# Patient Record
Sex: MALE | Race: BLACK OR AFRICAN AMERICAN | Employment: OTHER | URBAN - METROPOLITAN AREA
[De-identification: names, ages, dates, MRNs, and addresses within clinical notes are randomized per-mention and may not be internally consistent; named-entity substitution may affect disease eponyms.]

---

## 2018-06-14 ENCOUNTER — HOSPITAL ENCOUNTER (OUTPATIENT)
Dept: ULTRASOUND IMAGING | Age: 83
Discharge: HOME | End: 2018-06-14
Attending: INTERNAL MEDICINE
Payer: MEDICARE

## 2018-06-14 DIAGNOSIS — M79.89 PAIN AND SWELLING OF LOWER EXTREMITY, LEFT: ICD-10-CM

## 2018-06-14 DIAGNOSIS — M79.605 PAIN AND SWELLING OF LOWER EXTREMITY, LEFT: ICD-10-CM

## 2018-06-14 PROCEDURE — 93971 EXTREMITY STUDY: CPT

## 2018-07-23 ENCOUNTER — HOME HEALTH ADMISSION (OUTPATIENT)
Dept: HOME HEALTH SERVICES | Facility: HOME HEALTH | Age: 83
End: 2018-07-23

## 2018-07-25 ENCOUNTER — HOME CARE VISIT (OUTPATIENT)
Dept: SCHEDULING | Facility: HOME HEALTH | Age: 83
End: 2018-07-25

## 2020-11-04 PROBLEM — R35.1 NOCTURIA: Status: ACTIVE | Noted: 2020-11-04

## 2020-11-04 PROBLEM — N40.1 BENIGN PROSTATIC HYPERPLASIA WITH INCOMPLETE BLADDER EMPTYING: Status: ACTIVE | Noted: 2020-11-04

## 2020-11-04 PROBLEM — C67.9 BLADDER CANCER (HCC): Status: ACTIVE | Noted: 2020-11-04

## 2020-11-04 PROBLEM — R33.9 INCOMPLETE BLADDER EMPTYING: Status: ACTIVE | Noted: 2020-11-04

## 2020-11-04 PROBLEM — Z12.5 PROSTATE CANCER SCREENING: Status: ACTIVE | Noted: 2020-11-04

## 2020-11-04 PROBLEM — Z12.5 PROSTATE CANCER SCREENING: Status: RESOLVED | Noted: 2020-11-04 | Resolved: 2020-11-04

## 2020-11-04 PROBLEM — R39.14 BENIGN PROSTATIC HYPERPLASIA WITH INCOMPLETE BLADDER EMPTYING: Status: ACTIVE | Noted: 2020-11-04

## 2020-11-04 PROBLEM — I48.91 ATRIAL FIBRILLATION (HCC): Status: ACTIVE | Noted: 2020-11-04

## 2020-11-10 ENCOUNTER — OFFICE VISIT (OUTPATIENT)
Dept: UROLOGY | Age: 85
End: 2020-11-10
Payer: MEDICARE

## 2020-11-10 VITALS — TEMPERATURE: 97.6 F | WEIGHT: 290 LBS | HEIGHT: 75 IN | BODY MASS INDEX: 36.06 KG/M2

## 2020-11-10 DIAGNOSIS — R33.9 INCOMPLETE BLADDER EMPTYING: ICD-10-CM

## 2020-11-10 DIAGNOSIS — C67.9 MALIGNANT NEOPLASM OF URINARY BLADDER, UNSPECIFIED SITE (HCC): ICD-10-CM

## 2020-11-10 DIAGNOSIS — N40.1 BENIGN PROSTATIC HYPERPLASIA WITH INCOMPLETE BLADDER EMPTYING: ICD-10-CM

## 2020-11-10 DIAGNOSIS — R35.1 NOCTURIA: ICD-10-CM

## 2020-11-10 DIAGNOSIS — E66.01 SEVERE OBESITY (HCC): ICD-10-CM

## 2020-11-10 DIAGNOSIS — R39.14 BENIGN PROSTATIC HYPERPLASIA WITH INCOMPLETE BLADDER EMPTYING: ICD-10-CM

## 2020-11-10 DIAGNOSIS — I48.91 ATRIAL FIBRILLATION, UNSPECIFIED TYPE (HCC): ICD-10-CM

## 2020-11-10 LAB
AVG FLOW RATE POC: 5 ML/SECONDS
BILIRUB UR QL STRIP: NEGATIVE
FLOW TIME POC: 41 SECONDS
GLUCOSE UR-MCNC: NEGATIVE MG/DL
KETONES P FAST UR STRIP-MCNC: NEGATIVE MG/DL
MAX FLOW RATE POC: 10 ML/SECONDS
PH UR STRIP: 6 [PH] (ref 4.6–8)
PROT UR QL STRIP: NEGATIVE
PVR POC: 511 CC
SP GR UR STRIP: 1.01 (ref 1–1.03)
TIME TO MAX, POC: 9 SECONDS
UA UROBILINOGEN AMB POC: NORMAL (ref 0.2–1)
URINALYSIS CLARITY POC: CLEAR
URINALYSIS COLOR POC: YELLOW
URINE BLOOD POC: NEGATIVE
URINE LEUKOCYTES POC: NEGATIVE
URINE NITRITES POC: NEGATIVE
VOIDED VOLUME POC: 209 ML
VOIDING TIME POC: 41 SECONDS

## 2020-11-10 PROCEDURE — 51741 ELECTRO-UROFLOWMETRY FIRST: CPT | Performed by: UROLOGY

## 2020-11-10 PROCEDURE — 51798 US URINE CAPACITY MEASURE: CPT | Performed by: UROLOGY

## 2020-11-10 PROCEDURE — 52000 CYSTOURETHROSCOPY: CPT | Performed by: UROLOGY

## 2020-11-10 PROCEDURE — 81003 URINALYSIS AUTO W/O SCOPE: CPT | Performed by: UROLOGY

## 2020-11-10 PROCEDURE — 51725 SIMPLE CYSTOMETROGRAM: CPT | Performed by: UROLOGY

## 2020-11-10 RX ORDER — AMIODARONE HYDROCHLORIDE 200 MG/1
TABLET ORAL
COMMUNITY
Start: 2020-10-11

## 2020-11-10 RX ORDER — BISMUTH SUBSALICYLATE 262 MG
1 TABLET,CHEWABLE ORAL DAILY
COMMUNITY

## 2020-11-10 RX ORDER — SIMVASTATIN 20 MG/1
TABLET, FILM COATED ORAL
COMMUNITY
Start: 2020-08-13

## 2020-11-10 RX ORDER — METOPROLOL TARTRATE 50 MG/1
TABLET ORAL
COMMUNITY
Start: 2020-08-13

## 2020-11-10 RX ORDER — APIXABAN 2.5 MG/1
TABLET, FILM COATED ORAL
COMMUNITY
Start: 2020-11-04

## 2020-11-10 RX ORDER — DIGOXIN 125 MCG
TABLET ORAL
COMMUNITY
Start: 2020-09-19 | End: 2022-08-16

## 2020-11-10 RX ORDER — TORSEMIDE 20 MG/1
TABLET ORAL
COMMUNITY
Start: 2020-11-02 | End: 2022-08-16

## 2020-11-10 RX ORDER — LANOLIN ALCOHOL/MO/W.PET/CERES
CREAM (GRAM) TOPICAL
COMMUNITY

## 2020-11-10 NOTE — ASSESSMENT & PLAN NOTE
He has a known large prostate and incomplete emptying. He was counseled on a obstructing procedure. He is now ready to proceed to a ProLEP. The patient was counseled on the risks, benefits and expected course of surgery. Surgery has risks of bleeding, infection, injury, pain, death or other consequences. Some specific risks of surgery were discussed as well.

## 2020-11-10 NOTE — ASSESSMENT & PLAN NOTE
CMG and uroflow performed today. Not emptying well. He has significant retention of urine >500cc. I recommend a deobstructing procedure.

## 2020-11-10 NOTE — PROGRESS NOTES
HISTORY OF PRESENT ILLNESS  Trinh Gonzalez is a 80 y.o. male. Chief Complaint   Patient presents with    Cystoscopy     He is here today for his annual cystoscopic evaluation. He has a hx of bladder cancer in 2/2011.  5cm tumor resected by Dr. José Treadwell.  Subsequent biopsies without evidence of recurrence, but with chronic cystitis. He has been considering a de-obstructing procedure secondary to his voiding symptoms. He has a history of large residual volumes large residual volumes (449mls on 7/2/19) and slow stream.    Chronic Conditions Addressed Today     1. Benign prostatic hyperplasia with incomplete bladder emptying     Overview      He is not bothered by his symptoms, but does have large residual volumes (449mls on 7/2/19). He is considering a deobstructing procedure due to his incomplete emptying and slow stream. He will monitor and let us know when he is ready to do something, Oz Salcedo MD.           2. Incomplete bladder emptying     Overview      Large residual volumes. 3. Nocturia     Overview      He is up overnight to void. He is on diuretics. 4. Bladder cancer [facility])     Overview      He has a h/o bladder cancer in 2/2011.  5cm tumor resected by Dr. José Treadwell.  Subsequent biopsies without evidence of recurrence, but with chronic cystitis. Annual cystoscopy. 5. Atrial fibrillation ([address]     Overview      On anticoagulation                 Review of Systems   All other systems reviewed and are negative. Past Medical History:   Diagnosis Date    Cancer [facility])     bladder      Past Surgical History:   Procedure Laterality Date    HX UROLOGICAL  07/02/2019    CYSTOSCOPY      Family History   Problem Relation Age of Onset    Cancer Sister         Physical Exam  Constitutional:       General: He is not in acute distress. Appearance: Normal appearance. HENT:      Head: Normocephalic and atraumatic.       Comments: Wearing eye protection, face shield and a mask  Eyes:      Extraocular Movements: Extraocular movements intact. Cardiovascular:      Rate and Rhythm: Normal rate and regular rhythm. Pulmonary:      Effort: Pulmonary effort is normal. No respiratory distress. Breath sounds: Normal breath sounds. No wheezing or rhonchi. Abdominal:      General: There is no distension. Palpations: Abdomen is soft. There is no mass. Tenderness: There is no abdominal tenderness. Hernia: No hernia is present. Genitourinary:     Penis: Normal. No phimosis, hypospadias or tenderness. Scrotum/Testes: Normal.         Right: Mass, tenderness or swelling not present. Left: Mass, tenderness or swelling not present. Epididymis:      Right: Normal. No mass or tenderness. Left: Normal. No mass or tenderness. Musculoskeletal: Normal range of motion. Lymphadenopathy:      Cervical: No cervical adenopathy. Skin:     General: Skin is warm and dry. Neurological:      General: No focal deficit present. Mental Status: He is alert and oriented to person, place, and time. Psychiatric:         Mood and Affect: Mood normal.         Behavior: Behavior normal.       Cystoscopy - office    Patient presented for cystoscopy. He was placed supine on the procedure table and his genitals were prepped and with Betadine. Using 16 Spanish flexible cystoscope cystourethroscopy was performed. The urethra was patent without stricturing. The prostatic urethra had coapting lateral lobes. There was a minimal median lobe. The bladder mucosa was without trabeculation, tumors or concerning erythema. The ureteral orifices effluxing clear urine bilaterally. Summary: Benign bladder with BPH and incomplete emptying. CMG    Initial urge at (cc):  >500   Strong urge at (cc): not strong   Findings: No uninhibited contractions noted.   No urge related incontinence noted    Uroflow/ PVR    Max Flow: 10 ml/sec    Avg flow: 5 ml/sec    Voided Volume:  208ml    Residual Volume:511ml    Shape of the curve: flattened curve                ASSESSMENT and PLAN  Diagnoses and all orders for this visit:    1. Malignant neoplasm of urinary bladder, unspecified site [facility])  Assessment & Plan:  Cystoscopy performed today. No evidence of recurrence      2. Incomplete bladder emptying  Assessment & Plan:  CMG and uroflow performed today. Not emptying well. He has significant retention of urine >500cc. I recommend a deobstructing procedure. 3. Benign prostatic hyperplasia with incomplete bladder emptying  Assessment & Plan:  He has a known large prostate and incomplete emptying. He was counseled on a obstructing procedure. He is now ready to proceed to a ProLEP. The patient was counseled on the risks, benefits and expected course of surgery. Surgery has risks of bleeding, infection, injury, pain, death or other consequences. Some specific risks of surgery were discussed as well. 4. Nocturia  Assessment & Plan:  Large residual urines, poor flow. Has peripheral edema as well      5. Atrial fibrillation, unspecified type ([address]    6. Severe obesity ([address]  Assessment & Plan:  He is encouraged to continue to lose weight.         Other orders  -     AMB POC URINALYSIS DIP STICK AUTO W/O MICRO  -     AMB POC PVR, IVELISSE,POST-VOID RES,US,NON-IMAGING  -     AMB POC UROFLOWMETRY             Adair Vazquez MD

## 2020-12-08 ENCOUNTER — HOSPITAL ENCOUNTER (OUTPATIENT)
Dept: PREADMISSION TESTING | Age: 85
Discharge: HOME | End: 2020-12-08
Payer: MEDICARE

## 2020-12-08 VITALS
SYSTOLIC BLOOD PRESSURE: 115 MMHG | HEART RATE: 60 BPM | HEIGHT: 75 IN | OXYGEN SATURATION: 100 % | WEIGHT: 295 LBS | DIASTOLIC BLOOD PRESSURE: 60 MMHG | TEMPERATURE: 98.9 F | RESPIRATION RATE: 16 BRPM | BODY MASS INDEX: 36.68 KG/M2

## 2020-12-08 LAB
ANION GAP SERPL CALC-SCNC: 3 MMOL/L (ref 5–15)
APPEARANCE UR: ABNORMAL
BACTERIA URNS QL MICRO: NEGATIVE /HPF
BILIRUB UR QL: NEGATIVE
BUN SERPL-MCNC: 39 MG/DL (ref 6–20)
BUN/CREAT SERPL: 15 (ref 12–20)
CA-I BLD-MCNC: 8.5 MG/DL (ref 8.5–10.1)
CHLORIDE SERPL-SCNC: 105 MMOL/L (ref 97–108)
CO2 SERPL-SCNC: 33 MMOL/L (ref 21–32)
COLOR UR: ABNORMAL
CREAT SERPL-MCNC: 2.61 MG/DL (ref 0.7–1.3)
ERYTHROCYTE [DISTWIDTH] IN BLOOD BY AUTOMATED COUNT: 15.1 % (ref 11.5–14.5)
GLUCOSE SERPL-MCNC: 83 MG/DL (ref 65–100)
GLUCOSE UR STRIP.AUTO-MCNC: NEGATIVE MG/DL
HCT VFR BLD AUTO: 35.7 % (ref 36.6–50.3)
HGB BLD-MCNC: 11.2 G/DL (ref 12.1–17)
HGB UR QL STRIP: NEGATIVE
KETONES UR QL STRIP.AUTO: NEGATIVE MG/DL
LEUKOCYTE ESTERASE UR QL STRIP.AUTO: ABNORMAL
MCH RBC QN AUTO: 29.6 PG (ref 26–34)
MCHC RBC AUTO-ENTMCNC: 31.4 G/DL (ref 30–36.5)
MCV RBC AUTO: 94.4 FL (ref 80–99)
MUCOUS THREADS URNS QL MICRO: ABNORMAL /LPF
NITRITE UR QL STRIP.AUTO: POSITIVE
PH UR STRIP: 5 [PH] (ref 5–8)
PLATELET # BLD AUTO: 145 K/UL (ref 150–400)
PMV BLD AUTO: 10 FL (ref 8.9–12.9)
POTASSIUM SERPL-SCNC: 4.2 MMOL/L (ref 3.5–5.1)
PROT UR STRIP-MCNC: NEGATIVE MG/DL
RBC # BLD AUTO: 3.78 M/UL (ref 4.1–5.7)
RBC #/AREA URNS HPF: ABNORMAL /HPF (ref 0–5)
SODIUM SERPL-SCNC: 141 MMOL/L (ref 136–145)
SP GR UR REFRACTOMETRY: 1.01 (ref 1–1.03)
UROBILINOGEN UR QL STRIP.AUTO: 0.1 EU/DL (ref 0.1–1)
WBC # BLD AUTO: 3.3 K/UL (ref 4.1–11.1)
WBC URNS QL MICRO: >100 /HPF (ref 0–4)

## 2020-12-08 PROCEDURE — 81001 URINALYSIS AUTO W/SCOPE: CPT

## 2020-12-08 PROCEDURE — 36415 COLL VENOUS BLD VENIPUNCTURE: CPT

## 2020-12-08 PROCEDURE — 87077 CULTURE AEROBIC IDENTIFY: CPT

## 2020-12-08 PROCEDURE — 87086 URINE CULTURE/COLONY COUNT: CPT

## 2020-12-08 PROCEDURE — 87186 SC STD MICRODIL/AGAR DIL: CPT

## 2020-12-08 PROCEDURE — 85027 COMPLETE CBC AUTOMATED: CPT

## 2020-12-08 PROCEDURE — 80048 BASIC METABOLIC PNL TOTAL CA: CPT

## 2020-12-08 NOTE — PERIOP NOTES
Called Dr. Marivel Zuñiga office, spoke to Scot Estes, re: abnormal lab results, Scot Esets stated would make Dr. Po Clements aware.

## 2020-12-09 DIAGNOSIS — R33.9 INCOMPLETE BLADDER EMPTYING: Primary | ICD-10-CM

## 2020-12-09 RX ORDER — NITROFURANTOIN 25; 75 MG/1; MG/1
100 CAPSULE ORAL 2 TIMES DAILY
Qty: 20 CAP | Refills: 0 | Status: SHIPPED | OUTPATIENT
Start: 2020-12-09 | End: 2020-12-18 | Stop reason: ALTCHOICE

## 2020-12-09 NOTE — PERIOP NOTES
Return call from Kristin Ron, Dr. Jose Alberto Song office, Kristin Ron stated Dr. Jose Alberto Song received message and patient would be contacted by office.

## 2020-12-11 LAB
BACTERIA SPEC CULT: ABNORMAL
COLONY COUNT,CNT: ABNORMAL
SPECIAL REQUESTS,SREQ: ABNORMAL

## 2020-12-13 ENCOUNTER — HOSPITAL ENCOUNTER (OUTPATIENT)
Dept: PREADMISSION TESTING | Age: 85
Discharge: HOME | End: 2020-12-13
Payer: MEDICARE

## 2020-12-13 LAB — SARS-COV-2, COV2: NORMAL

## 2020-12-13 PROCEDURE — 87635 SARS-COV-2 COVID-19 AMP PRB: CPT

## 2020-12-14 LAB — SARS-COV-2, COV2NT: NOT DETECTED

## 2020-12-16 DIAGNOSIS — N30.00 ACUTE CYSTITIS WITHOUT HEMATURIA: Primary | ICD-10-CM

## 2020-12-16 NOTE — ASSESSMENT & PLAN NOTE
On Eliquis. He is s/p LEMP on 12/17/2020. Instructed to hold for 7 days pre-op and 3-7 days post-op until his urine is clear for several days. Key CAD CHF Meds             amiodarone (CORDARONE) 200 mg tablet     metoprolol tartrate (LOPRESSOR) 50 mg tablet TAKE 1 TABLET BY MOUTH TWICE A DAY WITH FOOD    Eliquis 2.5 mg tablet TAKE 1 TABLET BY MOUTH TWICE A DAY    simvastatin (ZOCOR) 20 mg tablet TAKE 1 TABLET BY MOUTH EVERYDAY AT BEDTIME    torsemide (DEMADEX) 20 mg tablet TAKE 1 TABLET BY MOUTH EVERY DAY    digoxin (LANOXIN) 0.125 mg tablet Takes 5x week.  Sunday, Monday, Wednesday, Thursday, Saturday        Lab Results   Component Value Date/Time    Sodium 141 12/08/2020 10:10 AM    Potassium 4.2 12/08/2020 10:10 AM

## 2020-12-16 NOTE — ASSESSMENT & PLAN NOTE
Annual cystoscopy. Due around 11/2021. Key Oncology Meds     Patient is on no Oncologic meds. Key Pain Meds     The patient is on no pain meds.         Lab Results   Component Value Date/Time    WBC 3.3 12/08/2020 10:10 AM    HGB 11.2 12/08/2020 10:10 AM    HCT 35.7 12/08/2020 10:10 AM    PLATELET 171 63/38/3054 10:10 AM    Creatinine 2.61 12/08/2020 10:10 AM    BUN 39 12/08/2020 10:10 AM

## 2020-12-16 NOTE — PROGRESS NOTES
HISTORY OF PRESENT ILLNESS  Rod Guthrie is a 80 y.o. male. Chief Complaint   Patient presents with    Post OP Follow Up    Voiding Trial     He is here today for a voiding trial following LEMP on 12/17/2020. He has no pain. He has some blood in the urine. He was on Eliquis. He was able to void easily after from home. All problems listed below were reviewed, updated, and discussed at this visit. Chronic Conditions Addressed Today     1. Benign prostatic hyperplasia with incomplete bladder emptying     Overview      He is not bothered by his symptoms, but does have large residual volumes (449mls on 7/2/19). He underwent a LEMP on 12/17/2020 due to his incomplete emptying and slow stream.  Follow up in 2-4 weeks to assess voiding patterns. Current Assessment & Plan      He is 1 day status post a laser enucleation of the prostate. His Suarez catheter was removed. He is instructed to hydrate well. He is also instructed to stay off blood thinners until his urine is clear for 2 days. 2. Incomplete bladder emptying     Overview      Large residual volumes. Current Assessment & Plan      He is s/p LEMP on 12/17/2020. Follow up in 2-4 weeks to assess voiding patterns. Relevant Orders     IRRIGATION OF BLADDER (Completed)    3. Nocturia     Overview      He is up overnight to void. He is on diuretics. Current Assessment & Plan      He is s/p LEMP on 12/17/2020. Follow up in 2-4 weeks to assess voiding patterns. On diuretics. 4. Bladder cancer [facility])     Overview      He has a h/o bladder cancer in 2/2011.  5cm tumor resected by Dr. Janes Escobedo.  Subsequent biopsies without evidence of recurrence, but with chronic cystitis. Annual cystoscopy. Due around 11/2021. Current Assessment & Plan      Annual cystoscopy. Due around 11/2021. Key Oncology Meds     Patient is on no Oncologic meds.         Key Pain Meds     The patient is on no pain meds. Lab Results   Component Value Date/Time    WBC 3.3 12/08/2020 10:10 AM    HGB 11.2 12/08/2020 10:10 AM    HCT 35.7 12/08/2020 10:10 AM    PLATELET 989 41/22/0878 10:10 AM    Creatinine 2.61 12/08/2020 10:10 AM    BUN 39 12/08/2020 10:10 AM            5. Atrial fibrillation ([address]     Overview      On anticoagulation          Current Assessment & Plan      On Eliquis. He is s/p LEMP on 12/17/2020. Instructed to hold for 7 days pre-op and 3-7 days post-op until his urine is clear for several days. Key CAD CHF Meds             amiodarone (CORDARONE) 200 mg tablet     metoprolol tartrate (LOPRESSOR) 50 mg tablet TAKE 1 TABLET BY MOUTH TWICE A DAY WITH FOOD    Eliquis 2.5 mg tablet TAKE 1 TABLET BY MOUTH TWICE A DAY    simvastatin (ZOCOR) 20 mg tablet TAKE 1 TABLET BY MOUTH EVERYDAY AT BEDTIME    torsemide (DEMADEX) 20 mg tablet TAKE 1 TABLET BY MOUTH EVERY DAY    digoxin (LANOXIN) 0.125 mg tablet Takes 5x week.  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    Chronic kidney disease     stage 2    Elevated cholesterol     Glaucoma     Hematuria     Hypertension     Kidney disorder     patient states not functioning at 100 percent, hasnt been told CKD      Past Surgical History:   Procedure Laterality Date    HX COLONOSCOPY      HX HEART CATHETERIZATION  2009    HX OTHER SURGICAL      removed mass, thymoma    HX PACEMAKER  2013    HX PARATHYROIDECTOMY  1990    HX UROLOGICAL  07/02/2019    CYSTOSCOPY     IR INSERT TUNL CVC W PORT OVER 5 YEARS      pt states hasnt had chemo in 2 years     Family History   Problem Relation Age of Onset  Cancer Sister     No Known Problems Mother     Stroke Father         Physical Exam                ASSESSMENT and PLAN  Diagnoses and all orders for this visit:    1. Malignant neoplasm of urinary bladder, unspecified site [facility])  Assessment & Plan:  Annual cystoscopy. Due around 11/2021. Key Oncology Meds     Patient is on no Oncologic meds. Key Pain Meds     The patient is on no pain meds. Lab Results   Component Value Date/Time    WBC 3.3 12/08/2020 10:10 AM    HGB 11.2 12/08/2020 10:10 AM    HCT 35.7 12/08/2020 10:10 AM    PLATELET 329 05/34/2991 10:10 AM    Creatinine 2.61 12/08/2020 10:10 AM    BUN 39 12/08/2020 10:10 AM         2. Benign prostatic hyperplasia with incomplete bladder emptying  Assessment & Plan:  He is 1 day status post a laser enucleation of the prostate. His Suarez catheter was removed. He is instructed to hydrate well. He is also instructed to stay off blood thinners until his urine is clear for 2 days. 3. Incomplete bladder emptying  Assessment & Plan:  He is s/p LEMP on 12/17/2020. Follow up in 2-4 weeks to assess voiding patterns. Orders:  -     IRRIGATION OF BLADDER    4. Nocturia  Assessment & Plan:  He is s/p LEMP on 12/17/2020. Follow up in 2-4 weeks to assess voiding patterns. On diuretics. 5. Atrial fibrillation, unspecified type [facility])  Assessment & Plan:  On Eliquis. He is s/p LEMP on 12/17/2020. Instructed to hold for 7 days pre-op and 3-7 days post-op until his urine is clear for several days. Key CAD CHF Meds             amiodarone (CORDARONE) 200 mg tablet     metoprolol tartrate (LOPRESSOR) 50 mg tablet TAKE 1 TABLET BY MOUTH TWICE A DAY WITH FOOD    Eliquis 2.5 mg tablet TAKE 1 TABLET BY MOUTH TWICE A DAY    simvastatin (ZOCOR) 20 mg tablet TAKE 1 TABLET BY MOUTH EVERYDAY AT BEDTIME    torsemide (DEMADEX) 20 mg tablet TAKE 1 TABLET BY MOUTH EVERY DAY    digoxin (LANOXIN) 0.125 mg tablet Takes 5x week.  Sunday, Monday, Wednesday, Thursday, Saturday        Lab Results   Component Value Date/Time    Sodium 141 12/08/2020 10:10 AM    Potassium 4.2 12/08/2020 10:10 AM                  Vicki Fuchs MD

## 2020-12-17 ENCOUNTER — ANESTHESIA (OUTPATIENT)
Dept: SURGERY | Age: 85
End: 2020-12-17
Payer: MEDICARE

## 2020-12-17 ENCOUNTER — ANESTHESIA EVENT (OUTPATIENT)
Dept: SURGERY | Age: 85
End: 2020-12-17
Payer: MEDICARE

## 2020-12-17 ENCOUNTER — HOSPITAL ENCOUNTER (OUTPATIENT)
Age: 85
Setting detail: OUTPATIENT SURGERY
Discharge: HOME | End: 2020-12-17
Attending: UROLOGY | Admitting: UROLOGY
Payer: MEDICARE

## 2020-12-17 VITALS
SYSTOLIC BLOOD PRESSURE: 122 MMHG | TEMPERATURE: 97.2 F | RESPIRATION RATE: 20 BRPM | OXYGEN SATURATION: 99 % | HEART RATE: 60 BPM | DIASTOLIC BLOOD PRESSURE: 69 MMHG

## 2020-12-17 LAB — POTASSIUM SERPL-SCNC: 4.4 MMOL/L (ref 3.5–5.1)

## 2020-12-17 PROCEDURE — 74011000258 HC RX REV CODE- 258: Performed by: NURSE ANESTHETIST, CERTIFIED REGISTERED

## 2020-12-17 PROCEDURE — 76210000006 HC OR PH I REC 0.5 TO 1 HR: Performed by: UROLOGY

## 2020-12-17 PROCEDURE — 74011000250 HC RX REV CODE- 250: Performed by: UROLOGY

## 2020-12-17 PROCEDURE — 77030039343: Performed by: UROLOGY

## 2020-12-17 PROCEDURE — 77030010545: Performed by: UROLOGY

## 2020-12-17 PROCEDURE — 36415 COLL VENOUS BLD VENIPUNCTURE: CPT

## 2020-12-17 PROCEDURE — 88305 TISSUE EXAM BY PATHOLOGIST: CPT

## 2020-12-17 PROCEDURE — 77030013189 HC SLV COMPR SCD HUNT -B: Performed by: UROLOGY

## 2020-12-17 PROCEDURE — 84132 ASSAY OF SERUM POTASSIUM: CPT

## 2020-12-17 PROCEDURE — 2709999900 HC NON-CHARGEABLE SUPPLY: Performed by: UROLOGY

## 2020-12-17 PROCEDURE — 77030007851 HC IRR CYSTO/TUR BSC -B: Performed by: UROLOGY

## 2020-12-17 PROCEDURE — 76060000034 HC ANESTHESIA 1.5 TO 2 HR: Performed by: UROLOGY

## 2020-12-17 PROCEDURE — 74011250636 HC RX REV CODE- 250/636: Performed by: UROLOGY

## 2020-12-17 PROCEDURE — 52649 PROSTATE LASER ENUCLEATION: CPT | Performed by: UROLOGY

## 2020-12-17 PROCEDURE — 76210000026 HC REC RM PH II 1 TO 1.5 HR: Performed by: UROLOGY

## 2020-12-17 PROCEDURE — 77030034696 HC CATH URETH FOL 2W BARD -A: Performed by: UROLOGY

## 2020-12-17 PROCEDURE — 74011250636 HC RX REV CODE- 250/636: Performed by: NURSE ANESTHETIST, CERTIFIED REGISTERED

## 2020-12-17 PROCEDURE — 74011000250 HC RX REV CODE- 250: Performed by: NURSE ANESTHETIST, CERTIFIED REGISTERED

## 2020-12-17 PROCEDURE — C1758 CATHETER, URETERAL: HCPCS | Performed by: UROLOGY

## 2020-12-17 PROCEDURE — 76010000162 HC OR TIME 1.5 TO 2 HR INTENSV-TIER 1: Performed by: UROLOGY

## 2020-12-17 RX ORDER — SODIUM CHLORIDE, SODIUM LACTATE, POTASSIUM CHLORIDE, CALCIUM CHLORIDE 600; 310; 30; 20 MG/100ML; MG/100ML; MG/100ML; MG/100ML
INJECTION, SOLUTION INTRAVENOUS
Status: DISCONTINUED | OUTPATIENT
Start: 2020-12-17 | End: 2020-12-17 | Stop reason: HOSPADM

## 2020-12-17 RX ORDER — METOPROLOL TARTRATE 5 MG/5ML
2.5 INJECTION INTRAVENOUS
Status: DISCONTINUED | OUTPATIENT
Start: 2020-12-17 | End: 2020-12-17 | Stop reason: HOSPADM

## 2020-12-17 RX ORDER — LABETALOL HCL 20 MG/4 ML
10 SYRINGE (ML) INTRAVENOUS
Status: DISCONTINUED | OUTPATIENT
Start: 2020-12-17 | End: 2020-12-17 | Stop reason: HOSPADM

## 2020-12-17 RX ORDER — FENTANYL CITRATE 50 UG/ML
50 INJECTION, SOLUTION INTRAMUSCULAR; INTRAVENOUS
Status: DISCONTINUED | OUTPATIENT
Start: 2020-12-17 | End: 2020-12-17 | Stop reason: HOSPADM

## 2020-12-17 RX ORDER — SODIUM CHLORIDE 9 MG/ML
20 INJECTION, SOLUTION INTRAVENOUS CONTINUOUS
Status: DISCONTINUED | OUTPATIENT
Start: 2020-12-17 | End: 2020-12-17 | Stop reason: HOSPADM

## 2020-12-17 RX ORDER — FENTANYL CITRATE 50 UG/ML
INJECTION, SOLUTION INTRAMUSCULAR; INTRAVENOUS AS NEEDED
Status: DISCONTINUED | OUTPATIENT
Start: 2020-12-17 | End: 2020-12-17 | Stop reason: HOSPADM

## 2020-12-17 RX ORDER — ONDANSETRON 2 MG/ML
4 INJECTION INTRAMUSCULAR; INTRAVENOUS AS NEEDED
Status: DISCONTINUED | OUTPATIENT
Start: 2020-12-17 | End: 2020-12-17 | Stop reason: HOSPADM

## 2020-12-17 RX ORDER — SODIUM CHLORIDE 0.9 % (FLUSH) 0.9 %
5-40 SYRINGE (ML) INJECTION AS NEEDED
Status: DISCONTINUED | OUTPATIENT
Start: 2020-12-17 | End: 2020-12-17 | Stop reason: HOSPADM

## 2020-12-17 RX ORDER — SODIUM CHLORIDE 0.9 % (FLUSH) 0.9 %
5-40 SYRINGE (ML) INJECTION EVERY 8 HOURS
Status: DISCONTINUED | OUTPATIENT
Start: 2020-12-17 | End: 2020-12-17 | Stop reason: HOSPADM

## 2020-12-17 RX ORDER — LIDOCAINE HYDROCHLORIDE 20 MG/ML
INJECTION, SOLUTION EPIDURAL; INFILTRATION; INTRACAUDAL; PERINEURAL AS NEEDED
Status: DISCONTINUED | OUTPATIENT
Start: 2020-12-17 | End: 2020-12-17 | Stop reason: HOSPADM

## 2020-12-17 RX ORDER — HYDROMORPHONE HYDROCHLORIDE 1 MG/ML
0.5 INJECTION, SOLUTION INTRAMUSCULAR; INTRAVENOUS; SUBCUTANEOUS
Status: DISCONTINUED | OUTPATIENT
Start: 2020-12-17 | End: 2020-12-17 | Stop reason: HOSPADM

## 2020-12-17 RX ORDER — SODIUM CHLORIDE 9 MG/ML
1000 INJECTION, SOLUTION INTRAVENOUS CONTINUOUS
Status: DISCONTINUED | OUTPATIENT
Start: 2020-12-17 | End: 2020-12-17 | Stop reason: HOSPADM

## 2020-12-17 RX ORDER — PROPOFOL 10 MG/ML
INJECTION, EMULSION INTRAVENOUS AS NEEDED
Status: DISCONTINUED | OUTPATIENT
Start: 2020-12-17 | End: 2020-12-17 | Stop reason: HOSPADM

## 2020-12-17 RX ADMIN — FENTANYL CITRATE 25 MCG: 50 INJECTION, SOLUTION INTRAMUSCULAR; INTRAVENOUS at 10:38

## 2020-12-17 RX ADMIN — SODIUM CHLORIDE 20 ML/HR: 9 INJECTION, SOLUTION INTRAVENOUS at 08:33

## 2020-12-17 RX ADMIN — FENTANYL CITRATE 50 MCG: 50 INJECTION, SOLUTION INTRAMUSCULAR; INTRAVENOUS at 09:47

## 2020-12-17 RX ADMIN — FENTANYL CITRATE 50 MCG: 50 INJECTION, SOLUTION INTRAMUSCULAR; INTRAVENOUS at 09:27

## 2020-12-17 RX ADMIN — LIDOCAINE HYDROCHLORIDE 100 MG: 20 INJECTION, SOLUTION EPIDURAL; INFILTRATION; INTRACAUDAL; PERINEURAL at 09:36

## 2020-12-17 RX ADMIN — SODIUM CHLORIDE, POTASSIUM CHLORIDE, SODIUM LACTATE AND CALCIUM CHLORIDE: 600; 310; 30; 20 INJECTION, SOLUTION INTRAVENOUS at 09:27

## 2020-12-17 RX ADMIN — PROPOFOL 200 MG: 10 INJECTION, EMULSION INTRAVENOUS at 09:36

## 2020-12-17 RX ADMIN — PHENYLEPHRINE HYDROCHLORIDE 200 MCG: 10 INJECTION INTRAVENOUS at 09:50

## 2020-12-17 RX ADMIN — PHENYLEPHRINE HYDROCHLORIDE 200 MCG: 10 INJECTION INTRAVENOUS at 09:40

## 2020-12-17 RX ADMIN — CEFAZOLIN SODIUM 2 G: 1 INJECTION, POWDER, FOR SOLUTION INTRAMUSCULAR; INTRAVENOUS at 09:46

## 2020-12-17 RX ADMIN — FENTANYL CITRATE 25 MCG: 50 INJECTION, SOLUTION INTRAMUSCULAR; INTRAVENOUS at 10:25

## 2020-12-17 NOTE — ANESTHESIA PREPROCEDURE EVALUATION
Relevant Problems   CARDIOVASCULAR   (+) Atrial fibrillation (HCC)      ENDOCRINE   (+) Severe obesity (HCC)      PERSONAL HX & FAMILY HX OF CANCER   (+) Bladder cancer (HCC)       Anesthetic History   No history of anesthetic complications            Review of Systems / Medical History  Patient summary reviewed, nursing notes reviewed and pertinent labs reviewed    Pulmonary  Within defined limits                 Neuro/Psych   Within defined limits           Cardiovascular    Hypertension        Dysrhythmias : atrial fibrillation  Pacemaker         GI/Hepatic/Renal  Within defined limits              Endo/Other        Obesity, arthritis and cancer     Other Findings   Comments: Bladder CA  Prostate CA  Thymoma           Physical Exam    Airway  Mallampati: II  TM Distance: 4 - 6 cm  Neck ROM: decreased range of motion   Mouth opening: Normal     Cardiovascular    Rhythm: regular  Rate: normal        Comments: Pacemaker Dental         Pulmonary  Breath sounds clear to auscultation               Abdominal  GI exam deferred       Other Findings            Anesthetic Plan    ASA: 4  Anesthesia type: general          Induction: Intravenous  Anesthetic plan and risks discussed with: Patient

## 2020-12-17 NOTE — DISCHARGE INSTRUCTIONS
Patient Education   Patient Education        Learning About Removing a Suarez Catheter at Home  Overview  An indwelling catheter helps drain your bladder. The most common type is a Suarez catheter. The Suarez catheter is a thin tube that goes into your urethra. It's held in your bladder by a small balloon filled with fluid. The tube drains urine from your bladder into a bag or container. You may have had the catheter for a few days, weeks, or months. You can remove the catheter at home when your doctor says it's okay to remove it. Get ready to remove the catheter  How to get ready to remove a Suarez catheter at home   1. Empty the urine bag. You can empty it into the toilet or a container, as you normally do. 2. Wash your hands. You can also wear disposable gloves if you want to. How to position yourself to remove a Suarez catheter   1. Remove the tape or straps that hold the catheter to your body. It's usually attached to your thigh. 2. Clean your genital area. You can use soap, water, and a clean cloth. 3. Lie down on your back. You can lie flat on your back. It may be more comfortable with your knees bent. Or you can be in a standing position to remove the catheter, if it's comfortable. Use an absorbent pad or towel to catch any extra urine that comes out. How to drain a Suarez catheter's balloon   1. Prepare the syringe. You may need to move the plunger up and down a few times to loosen it. Leave it open about .5 mL.  2. Insert the tip of the syringe into the balloon port on your catheter. Make sure that you know which port is the balloon port. It's not the one where the urine usually comes out. 3. Allow the fluid to drain out. It should drain on its own, without you pulling the plunger back more. You may need to move the syringe lower to get the fluid to drain. 4. If the syringe fills, empty it. You can empty it into a sink or toilet.  Reattach the syringe and see if any more fluid drains out. You want to completely empty the balloon. How to pull out a Suarez catheter   1. Gently pull the catheter out of your urethra. Pull it slowly and smoothly. 2. Take controlled breaths. This will help you relax. The catheter should slide out easily. 3. Do not force the catheter out. If it doesn't slide out easily, use the syringe again to try to drain more liquid from the balloon. 4. Clean up. Throw away the catheter, the bag, and the absorbent pad, if you used one. You may also want to clean the area around your genitals again. 5. Wash your hands again. How to care for yourself after you remove a Suarez catheter   1. Hydrate. Drink extra fluids for a little while, unless your doctor tells you not to.  2. Urinate. Urinate as you did before you had the catheter. Your doctor may want to measure the amount of urine to make sure that your bladder is working as it should. 3. Be prepared for some discomfort. It may burn a little bit and you may see a small amount of blood in your urine the first few times you urinate after removal.  4. Try a warm bath. If it's hard for you to urinate, try sitting in a few inches of warm water in the bath (sitz bath). If the urge to urinate comes during the sitz bath, it may be easier for you to urinate while you're still in the bath. When should you call for help? Call your doctor now or seek immediate medical care if:    · The catheter gets stuck or hurts when you remove it.     · The catheter looks like it's broken.     · You have problems urinating after the catheter comes out.     · You can't easily remove the catheter. Watch closely for changes in your health, and be sure to contact your doctor if you have any problems. Where can you learn more? Go to http://www.gray.com/  Enter F255 in the search box to learn more about \"Learning About Removing a Suarez Catheter at Home. \"  Current as of: June 29, 2020               Content Version: 12.6  © 6028-6166 [employer]. Care instructions adapted under license by [employer] (which disclaims liability or warranty for this information). If you have questions about a medical condition or this instruction, always ask your healthcare professional. [address] any warranty or liability for your use of this information. Learning About Anesthesia  What is anesthesia? Anesthesia controls pain. And it keeps all your organs working normally during surgery or another kind of procedure. Anesthesia can relax you. It can also make you sleepy or forgetful. Or it may make you unconscious. It depends on what kind you get. Your anesthesia provider (anesthesiologist or nurse anesthetist) will make sure you are comfortable and safe during the procedure or surgery. There are different types of anesthesia. · Local anesthesia. This type numbs a small part of the body. Doctors use it for simple procedures. ? You get a shot in the area the doctor will work on.  ? You will feel some pressure during the procedure. ? You may stay awake. Or you may get medicine to help you relax or sleep. · Regional anesthesia. This type blocks pain to a larger area of the body. It can also help relieve pain right after surgery. And it may reduce your need for other pain medicine after surgery. There are different types. They include:  ? Peripheral nerve block. This is a shot near a specific nerve or group of nerves. It blocks pain in the part of the body supplied by the nerve. This is often used for procedures on the hands, arms, feet, legs, or face. ? Epidural and spinal anesthesia. This is a shot near the spinal cord and the nerves around it. It blocks pain from an entire area of the body, such as the belly, hips, or legs. · General anesthesia. This type affects the brain and the whole body.  You may get it through a small tube placed in a vein (IV). Or you may breathe it in. You are unconscious and will not feel pain. During the surgery, you will be comfortable. Later, you will not remember much about the surgery. What type will you have? The type of anesthesia you have depends on many things, such as:  · The type of surgery or procedure and the reason you are having it. · Test results, such as blood tests. · How worried you feel about the surgery. · Your health. Your doctor and nurses will ask you about any past surgeries. They will ask about any health problems you may have, such as diabetes, lung or heart disease, or a history of stroke. They will want to know if you take medicine, such as blood thinners. Your doctor may also ask if any family members have had any problems with anesthesia. You will talk with your anesthesia provider about your options. In many cases, you may be able to choose the type of anesthesia you have. What are the risks of anesthesia? Major side effects are not common. But all types of anesthesia have some risk. Your risk depends on your overall health. It also depends on the type of anesthesia you have and how you respond to it. Serious but rare risks include breathing problems, heart attack, stroke, and reaction to the medicine. Some health conditions increase the risk of problems. Your anesthesia provider will find out about any health problems you have that may affect your care. Your anesthesia provider will closely watch your vital signs during anesthesia and surgery. This includes checking your blood pressure and heart rate. This may help you avoid problems from anesthesia. What can you do to prepare? You will get a list of instructions to help you prepare. Your doctor will let you know what to expect when you get to the hospital, during the surgery, and after. You will get instructions about when to stop eating and drinking.   If you take medicine, you will get instructions about what you can and can't take before surgery. You will be asked to sign a consent form that says you understand the risks of anesthesia. Before you do, your anesthesia provider will talk with you about the best type for you and the risks and benefits of that type. Many people are nervous before they have anesthesia and surgery. Ask your doctor about ways to relax before surgery. These may include relaxation exercises or medicine. What can you expect after having anesthesia? Right after the surgery, you will be in the recovery room. Nurses will make sure you are comfortable. As the anesthesia wears off, you may feel some pain and discomfort from your surgery. Tell someone if you have pain. Pain medicine works better if you take it before the pain gets bad. You may feel some of the effects of anesthesia for a while. It takes time for the effects of the medicine to completely wear off. · If you had local or regional anesthesia you may feel numb and have less feeling in part of your body. It may also take a few hours for you to be able to move and control your muscles as usual.  · When you first wake up from general anesthesia, you may be confused. Or it may be hard to think clearly. This is normal.  · Don't do anything for 24 hours that requires attention to detail. This includes going to work, making important decisions, or signing any legal documents. Other common side effects of anesthesia include:  · Nausea and vomiting. This does not usually last long. It can be treated with medicine. · A slight drop in body temperature. You may feel cold and shiver when you first wake up. · A sore throat, if you had general anesthesia. · Muscle aches or weakness. · Feeling tired. For minor surgeries, you may go home the same day. For other surgeries you may stay in the hospital. Your doctor will check on your recovery from the anesthesia. He or she will answer any questions you may have.   Follow-up care is a key part of your treatment and safety. Be sure to make and go to all appointments, and call your doctor if you are having problems. It's also a good idea to know your test results and keep a list of the medicines you take. Where can you learn more? Go to http://www.gray.com/  Enter V817 in the search box to learn more about \"Learning About Anesthesia. \"  Current as of: August 22, 2019               Content Version: 12.6  © 2846-8111 [employer]. Care instructions adapted under license by [employer] (which disclaims liability or warranty for this information). If you have questions about a medical condition or this instruction, always ask your healthcare professional. [address] any warranty or liability for your use of this information. Do not take blood thinners if there is any bleeding/ blood in the urine. If urine is clear for 48 hrs then ok to resume. Routine alanis catheter care instructions should be given by discharge nurse.

## 2020-12-17 NOTE — PROGRESS NOTES
Alanis cath intact with bloody urine draining without any clots noted. DC instructions and alanis cath care instruction reviewed with pt and his wife. Pt escorted out via wheelchair to front entrance for dc home with family.

## 2020-12-17 NOTE — ANESTHESIA POSTPROCEDURE EVALUATION
Procedure(s):  CYSTOURETHROSCOPY WITH LASER ENUCLEATION MORCELLATION OF THE PROSTATE.    general    Anesthesia Post Evaluation      Multimodal analgesia: multimodal analgesia used between 6 hours prior to anesthesia start to PACU discharge  Patient location during evaluation: PACU  Patient participation: complete - patient participated  Level of consciousness: awake  Pain score: 0  Pain management: adequate  Airway patency: patent  Anesthetic complications: no  Cardiovascular status: acceptable  Respiratory status: acceptable  Hydration status: acceptable  Post anesthesia nausea and vomiting:  controlled  Final Post Anesthesia Temperature Assessment:  Normothermia (36.0-37.5 degrees C)      INITIAL Post-op Vital signs:   Vitals Value Taken Time   /66 12/17/20 1131   Temp 36.2 °C (97.2 °F) 12/17/20 1114   Pulse 60 12/17/20 1131   Resp 12 12/17/20 1131   SpO2 99 % 12/17/20 1131

## 2020-12-17 NOTE — OP NOTES
UROLOGY OPERATIVE NOTE    Patient: Britni Dumont MRN: [id number]  SSN: [id number]    YOB: 1935  Age: 80 y.o. Sex: male          Pre-operative Diagnosis: Benign localized hyperplasia of prostate with urinary retention [N40.1]  Incomplete emptying of bladder due to benign prostatic hyperplasia [N40.1, R39.14]  Post-operative Diagnosis: Benign localized hyperplasia of prostate with urinary retention [N40.1]  Incomplete emptying of bladder due to benign prostatic hyperplasia [N40.1, R39.14]  Procedure: Cystourethroscopy  laser enucleation with morcellation of the prostate    Surgeon: Sam Serra MD    Anesthesia:  General  Findings: Bilateral coapting lateral lobes, mild median lobe  Estimated Blood Loss:       scant  Drains: 20F alanis catheter  Specimens: prostate chips  Implants: * No implants in log *  Complications: none           Procedure Details: The patient was seen in the pre-operative area. The risks, benefits, complications, alternative treatment options, and expected outcomes were again discussed with the patient. The possibilities of reaction to medication, pain, infection, bleeding, major cardiovascular event, death, damage to surrounding structures were specifically addressed. Informed consent was then obtained. Upon arrival to the operative suite, the patient, procedure, and side were confirmed via a pre-operative \"time-out\". All were in agreement. The patient was carefully positioned and anesthesia was undertaken. Sterile prep and drape was accomplished. Patient was in the lithotomy position. Using a 21 Qatari cystoscope with 30 and 70 degree lenses complete cystoscopy was performed. The urethra was unremarkable. The bladder mucosa was normal in appearance without tumors, lesions or trabeculation seen. The ureteral orifices were seen on either side. There was a slight median lobe of the prostate and large coapting lateral lobes.     Using a [place] obturator a 32 Monegasque continuous flow resectoscope sheath was introduced. The loop laser scope was introduced and with a end-fire laser fiber and the 1470 Protouch laser the prostate was scored at the 5 o'clock position on the left posterior side from the bladder neck to the verumontanum. Similarly the prostate was scored at the 7 o'clock position. The median lobe was undermined and enucleated to the bladder. The prostate was scored at the 1:00 o'clock position on the left down to near capsular fibers. This was resected down to the level of the verumontanum. Adenomatous tissue was undermined and enucleated into the bladder. Similarly the right lateral lobe was taken down. Anterior prostatic tissue was ablated. Distal lateral nodular tissue was ablated down. The laser scope was removed and a offset cystoscope was introduced. Using dual inflow irrigation, the suction morcellator was introduced. A free-floating adenoma was suctioned morcellated. There appeared to be no residual chips within the bladder. The offset scope was removed and the laser scope was reintroduced. The tissue alongside the verumontanum was ablated. Hemostasis was excellent. The scope was removed and the patient had a good urine stream with Crede maneuver. An 21 Monegasque Suarez catheter was introduced with 10 cc in the balloon. The outflow was trace pink. The patient tolerated the procedure well and was transported in stable condition to recovery. The patient was transported in stable condition to recovery.      Rochelle Cowan MD

## 2020-12-18 ENCOUNTER — OFFICE VISIT (OUTPATIENT)
Dept: UROLOGY | Age: 85
End: 2020-12-18
Payer: MEDICARE

## 2020-12-18 VITALS — BODY MASS INDEX: 35.93 KG/M2 | WEIGHT: 289 LBS | HEIGHT: 75 IN | TEMPERATURE: 97.5 F

## 2020-12-18 DIAGNOSIS — C67.9 MALIGNANT NEOPLASM OF URINARY BLADDER, UNSPECIFIED SITE (HCC): ICD-10-CM

## 2020-12-18 DIAGNOSIS — R39.14 BENIGN PROSTATIC HYPERPLASIA WITH INCOMPLETE BLADDER EMPTYING: ICD-10-CM

## 2020-12-18 DIAGNOSIS — N40.1 BENIGN PROSTATIC HYPERPLASIA WITH INCOMPLETE BLADDER EMPTYING: ICD-10-CM

## 2020-12-18 DIAGNOSIS — R33.9 INCOMPLETE BLADDER EMPTYING: ICD-10-CM

## 2020-12-18 DIAGNOSIS — I48.91 ATRIAL FIBRILLATION, UNSPECIFIED TYPE (HCC): ICD-10-CM

## 2020-12-18 DIAGNOSIS — R35.1 NOCTURIA: ICD-10-CM

## 2020-12-18 PROCEDURE — 99024 POSTOP FOLLOW-UP VISIT: CPT | Performed by: UROLOGY

## 2020-12-18 PROCEDURE — 51700 IRRIGATION OF BLADDER: CPT | Performed by: UROLOGY

## 2020-12-18 NOTE — ASSESSMENT & PLAN NOTE
He is 1 day status post a laser enucleation of the prostate. His Suarez catheter was removed. He is instructed to hydrate well. He is also instructed to stay off blood thinners until his urine is clear for 2 days.

## 2021-12-14 ENCOUNTER — OFFICE VISIT (OUTPATIENT)
Dept: UROLOGY | Age: 86
End: 2021-12-14
Payer: MEDICARE

## 2021-12-14 VITALS
HEART RATE: 66 BPM | WEIGHT: 296 LBS | SYSTOLIC BLOOD PRESSURE: 151 MMHG | HEIGHT: 75 IN | BODY MASS INDEX: 36.8 KG/M2 | DIASTOLIC BLOOD PRESSURE: 67 MMHG | OXYGEN SATURATION: 98 %

## 2021-12-14 DIAGNOSIS — C67.9 MALIGNANT NEOPLASM OF URINARY BLADDER, UNSPECIFIED SITE (HCC): Primary | ICD-10-CM

## 2021-12-14 DIAGNOSIS — R35.1 NOCTURIA: ICD-10-CM

## 2021-12-14 DIAGNOSIS — R39.14 BENIGN PROSTATIC HYPERPLASIA WITH INCOMPLETE BLADDER EMPTYING: ICD-10-CM

## 2021-12-14 DIAGNOSIS — R33.9 INCOMPLETE BLADDER EMPTYING: ICD-10-CM

## 2021-12-14 DIAGNOSIS — N40.1 BENIGN PROSTATIC HYPERPLASIA WITH INCOMPLETE BLADDER EMPTYING: ICD-10-CM

## 2021-12-14 LAB
BILIRUB UR QL STRIP: NEGATIVE
GLUCOSE UR-MCNC: NEGATIVE MG/DL
KETONES P FAST UR STRIP-MCNC: NEGATIVE MG/DL
PH UR STRIP: 6 [PH] (ref 4.6–8)
PROT UR QL STRIP: NEGATIVE
SP GR UR STRIP: 1.01 (ref 1–1.03)
UA UROBILINOGEN AMB POC: NORMAL (ref 0.2–1)
URINALYSIS CLARITY POC: CLEAR
URINALYSIS COLOR POC: YELLOW
URINE BLOOD POC: NEGATIVE
URINE LEUKOCYTES POC: NEGATIVE
URINE NITRITES POC: NEGATIVE

## 2021-12-14 PROCEDURE — G8417 CALC BMI ABV UP PARAM F/U: HCPCS | Performed by: UROLOGY

## 2021-12-14 PROCEDURE — G8536 NO DOC ELDER MAL SCRN: HCPCS | Performed by: UROLOGY

## 2021-12-14 PROCEDURE — 81003 URINALYSIS AUTO W/O SCOPE: CPT | Performed by: UROLOGY

## 2021-12-14 PROCEDURE — G8432 DEP SCR NOT DOC, RNG: HCPCS | Performed by: UROLOGY

## 2021-12-14 PROCEDURE — 99214 OFFICE O/P EST MOD 30 MIN: CPT | Performed by: UROLOGY

## 2021-12-14 PROCEDURE — G8427 DOCREV CUR MEDS BY ELIG CLIN: HCPCS | Performed by: UROLOGY

## 2021-12-14 NOTE — PROGRESS NOTES
HISTORY OF PRESENT ILLNESS  Eimlia Frames is a 80 y.o. male. has a past medical history of Arrhythmia, Arthritis, Cancer ([address] Cancer ([address] Chronic kidney disease, Elevated cholesterol, Glaucoma, Hematuria, Hypertension, and Kidney disorder. has a past surgical history that includes ir insert tunl cvc w port over 5 years; hx parathyroidectomy (1990); hx other surgical; hx heart catheterization (2009); hx colonoscopy; hx urological (07/02/2019); hx urological (12/17/2020); hx prostate surgery (12/17/2020); hx pacemaker (2013); and hx pacemaker. Chief Complaint   Patient presents with    Follow-up    Bladder Cancer    Nocturia    Incomplete Bladder Emptying     HPI  Chronic Conditions Addressed Today     1. Benign prostatic hyperplasia with incomplete bladder emptying     Overview      He is not bothered by his symptoms, but does have large residual volumes (449mls on 7/2/19). He underwent a LEMP on 12/17/2020 due to his incomplete emptying and slow stream.  Follow up in 2-4 weeks to assess voiding patterns. Current Assessment & Plan      He feels he has good flow. 2. Incomplete bladder emptying     Overview      Large residual volumes. He is s/p LEMP on 12/17/2020. Current Assessment & Plan       He is doing well. 3. Nocturia     Overview      He is up overnight to void. He is on diuretics. He is s/p LEMP 12/17/2020.         4. Bladder cancer [facility]) - Primary     Overview      12/16/2020: He has a h/o bladder cancer in 2/2011.  5cm tumor resected by Dr. Omi Alonso.  Subsequent biopsies without evidence of recurrence, but with chronic cystitis. Annual cystoscopy. Due around 11/2021. Current Assessment & Plan      He is due for cystoscopy. We can set him up for that.                  Past Medical History:    PMHx (including negatives):  has a past medical history of Arrhythmia, Arthritis, Cancer ([address] Cancer ([address] Chronic kidney disease, Elevated cholesterol, Glaucoma, Hematuria, Hypertension, and Kidney disorder. PSurgHx:  has a past surgical history that includes ir insert tunl cvc w port over 5 years; hx parathyroidectomy (1990); hx other surgical; hx heart catheterization (2009); hx colonoscopy; hx urological (07/02/2019); hx urological (12/17/2020); hx prostate surgery (12/17/2020); hx pacemaker (2013); and hx pacemaker. PSocHx:  reports that he has quit smoking. He quit after 20.00 years of use. He has never used smokeless tobacco. He reports previous alcohol use. He reports that he does not use drugs. ROS  Physical Exam  Allergies   Allergen Reactions    Codeine Unknown (comments)     hallucination    Doxycycline Unknown (comments)     Blurry vision      Prior to Admission medications    Medication Sig Start Date End Date Taking? Authorizing Provider   amiodarone (CORDARONE) 200 mg tablet  10/11/20  Yes Provider, Historical   metoprolol tartrate (LOPRESSOR) 50 mg tablet TAKE 1 TABLET BY MOUTH TWICE A DAY WITH FOOD 8/13/20  Yes Provider, Historical   Eliquis 2.5 mg tablet TAKE 1 TABLET BY MOUTH TWICE A DAY 11/4/20  Yes Provider, Historical   simvastatin (ZOCOR) 20 mg tablet TAKE 1 TABLET BY MOUTH EVERYDAY AT BEDTIME 8/13/20  Yes Provider, Historical   torsemide (DEMADEX) 20 mg tablet TAKE 1 TABLET BY MOUTH EVERY DAY 11/2/20  Yes Provider, Historical   digoxin (LANOXIN) 0.125 mg tablet Takes 5x week. Sunday, Monday, Wednesday, Thursday, Saturday 9/19/20  Yes Provider, Historical   multivitamin (ONE A DAY) tablet Take 1 Tab by mouth daily. Yes Provider, Historical   ferrous sulfate (Iron) 325 mg (65 mg iron) tablet Take  by mouth Daily (before breakfast). Yes Provider, Historical        ASSESSMENT and PLAN  Diagnoses and all orders for this visit:    1. Malignant neoplasm of urinary bladder, unspecified site [facility])  Assessment & Plan:  He is due for cystoscopy. We can set him up for that.         2. Incomplete bladder emptying  Assessment & Plan:   He is doing well. 3. Nocturia    4. Benign prostatic hyperplasia with incomplete bladder emptying  Assessment & Plan:  He feels he has good flow.              Susie Duran MD

## 2021-12-14 NOTE — LETTER
12/14/2021    Patient: Bernell Councilman   YOB: 1935   Date of Visit: 12/14/2021     Arun Smith MD  [address] [place] [address]  [facility] [address]    Dear Arun Smith MD,      Thank you for referring Mr. Dionne Cano to [place] [address] for evaluation. My notes for this consultation are attached. If you have questions, please do not hesitate to call me. I look forward to following your patient along with you.       Sincerely,    Siva Villatoro MD

## 2021-12-14 NOTE — PROGRESS NOTES
Chief Complaint   Patient presents with    Follow-up    Bladder Cancer    Nocturia    Incomplete Bladder Emptying     1. Have you been to the ER, urgent care clinic since your last visit? Hospitalized since your last visit? Yes Where: [facility]     2. Have you seen or consulted any other health care providers outside of the [address] [place] [address] since your last visit? Include any pap smears or colon screening.  No  Visit Vitals  BP (!) 151/67 (BP 1 Location: Right upper arm, BP Patient Position: Sitting, BP Cuff Size: Adult)   Pulse 66   Ht 6' 3\" (1.905 m)   Wt 296 lb (134.3 kg)   SpO2 98%   BMI 37.00 kg/m²

## 2022-03-18 PROBLEM — N40.1 BENIGN PROSTATIC HYPERPLASIA WITH INCOMPLETE BLADDER EMPTYING: Status: ACTIVE | Noted: 2020-11-04

## 2022-03-18 PROBLEM — R39.14 BENIGN PROSTATIC HYPERPLASIA WITH INCOMPLETE BLADDER EMPTYING: Status: ACTIVE | Noted: 2020-11-04

## 2022-03-18 PROBLEM — C67.9 BLADDER CANCER (HCC): Status: ACTIVE | Noted: 2020-11-04

## 2022-03-19 PROBLEM — I48.91 ATRIAL FIBRILLATION (HCC): Status: ACTIVE | Noted: 2020-11-04

## 2022-03-19 PROBLEM — R35.1 NOCTURIA: Status: ACTIVE | Noted: 2020-11-04

## 2022-03-19 PROBLEM — R33.9 INCOMPLETE BLADDER EMPTYING: Status: ACTIVE | Noted: 2020-11-04

## 2022-03-19 PROBLEM — E66.01 SEVERE OBESITY (HCC): Status: ACTIVE | Noted: 2020-11-10

## 2022-03-22 ENCOUNTER — OFFICE VISIT (OUTPATIENT)
Dept: UROLOGY | Age: 87
End: 2022-03-22
Payer: MEDICARE

## 2022-03-22 ENCOUNTER — TELEPHONE (OUTPATIENT)
Dept: UROLOGY | Age: 87
End: 2022-03-22

## 2022-03-22 VITALS — WEIGHT: 296 LBS | BODY MASS INDEX: 36.8 KG/M2 | HEIGHT: 75 IN

## 2022-03-22 DIAGNOSIS — R35.1 NOCTURIA: ICD-10-CM

## 2022-03-22 DIAGNOSIS — C67.9 MALIGNANT NEOPLASM OF URINARY BLADDER, UNSPECIFIED SITE (HCC): Primary | ICD-10-CM

## 2022-03-22 DIAGNOSIS — N99.116 POSTPROCEDURAL STRICTURE OF OVERLAPPING SITES OF URETHRA IN MALE: ICD-10-CM

## 2022-03-22 DIAGNOSIS — I48.91 ATRIAL FIBRILLATION, UNSPECIFIED TYPE (HCC): ICD-10-CM

## 2022-03-22 DIAGNOSIS — N40.1 BENIGN PROSTATIC HYPERPLASIA WITH INCOMPLETE BLADDER EMPTYING: ICD-10-CM

## 2022-03-22 DIAGNOSIS — R33.9 INCOMPLETE BLADDER EMPTYING: ICD-10-CM

## 2022-03-22 DIAGNOSIS — R39.14 BENIGN PROSTATIC HYPERPLASIA WITH INCOMPLETE BLADDER EMPTYING: ICD-10-CM

## 2022-03-22 LAB
BILIRUB UR QL: NEGATIVE
GLUCOSE UR-MCNC: NEGATIVE MG/DL
KETONES P FAST UR STRIP-MCNC: NEGATIVE MG/DL
PH UR STRIP: 6.5 [PH] (ref 4.6–8)
PROT UR QL STRIP: NEGATIVE
SP GR UR STRIP: 1.02 (ref 1–1.03)
UA UROBILINOGEN AMB POC: NORMAL (ref 0.2–1)
URINALYSIS CLARITY POC: CLEAR
URINALYSIS COLOR POC: YELLOW
URINE BLOOD POC: NEGATIVE
URINE LEUKOCYTES POC: NEGATIVE
URINE NITRITES POC: NEGATIVE

## 2022-03-22 PROCEDURE — 52281 CYSTOSCOPY AND TREATMENT: CPT | Performed by: UROLOGY

## 2022-03-22 PROCEDURE — G8417 CALC BMI ABV UP PARAM F/U: HCPCS | Performed by: UROLOGY

## 2022-03-22 PROCEDURE — G8427 DOCREV CUR MEDS BY ELIG CLIN: HCPCS | Performed by: UROLOGY

## 2022-03-22 PROCEDURE — G8536 NO DOC ELDER MAL SCRN: HCPCS | Performed by: UROLOGY

## 2022-03-22 PROCEDURE — 81003 URINALYSIS AUTO W/O SCOPE: CPT | Performed by: UROLOGY

## 2022-03-22 PROCEDURE — 99214 OFFICE O/P EST MOD 30 MIN: CPT | Performed by: UROLOGY

## 2022-03-22 PROCEDURE — G8510 SCR DEP NEG, NO PLAN REQD: HCPCS | Performed by: UROLOGY

## 2022-03-22 RX ORDER — CEPHALEXIN 500 MG/1
500 CAPSULE ORAL 3 TIMES DAILY
Qty: 14 CAPSULE | Refills: 0 | Status: SHIPPED | OUTPATIENT
Start: 2022-03-22 | End: 2022-03-29 | Stop reason: ALTCHOICE

## 2022-03-22 NOTE — PROGRESS NOTES
Cystoscopy - urethral dilation- Male    Findings:    Initial residual: increased but may be due to irrigation  Anterior urethra: mild pendulous urethral stricturing. Prostate: dense prostatic urethral stricture    Unable to pass scope. [employer] sounds used to dilate from 12F to 20F. Scope then able to be passed. Still glide wire inserted via the scope. Bladder mucosa: No obvious tumors. Trabeculation: 1+  Diverticula: none  Ureteral orifices: not well seen due to hematuria after dilation    Over the wire, an 18F [place] tip alanis was inserted. 10cc in balloon. Leg bag applied. Plan on 1 week with alanis.

## 2022-03-22 NOTE — ASSESSMENT & PLAN NOTE
History of laser enucleation of the prostate 12/17/2020. Belén Eaton He had incomplete emptying at that time. He notes good flow afterwards.

## 2022-03-22 NOTE — ASSESSMENT & PLAN NOTE
History of bladder cancer in 2011. Here for annual cystoscopy today. He was found to have a urethral stricture which was dilated. Reconstruction of bladder revealed no obvious tumors however visualization was limited. Plan on follow-up cystoscopy in 3 months.

## 2022-03-22 NOTE — PROGRESS NOTES
HISTORY OF PRESENT ILLNESS  Dulce Chavez is a 80 y.o. male. has a past medical history of Arrhythmia, Arthritis, Cancer ([facility] [address] Cancer ([facility] [address] Chronic kidney disease, Elevated cholesterol, Glaucoma, Hematuria, Hypertension, and Kidney disorder. has a past surgical history that includes ir insert tunl cvc w port over 5 years; hx parathyroidectomy (1990); hx other surgical; hx heart catheterization (2009); hx colonoscopy; hx prostate surgery (12/17/2020); hx pacemaker (2013); hx pacemaker; hx urological (07/02/2019); hx urological (12/17/2020); and hx urological (03/22/2022). Chief Complaint   Patient presents with    Cystoscopy     urethral dilation- 18fr cath     Bladder Cancer     He denies any back pain. He does not note change in voiding symptoms. He feels like his flow is adequate. No burning irritation today. Chronic Conditions Addressed Today     1. Benign prostatic hyperplasia with incomplete bladder emptying     Overview      He is not bothered by his symptoms, but does have large residual volumes (449mls on 7/2/19). He underwent a LEMP on 12/17/2020 due to his incomplete emptying and slow stream.      12/14/21: good flow after LEMP. Current Assessment & Plan       History of laser enucleation of the prostate 12/17/2020. Debbie Charles He had incomplete emptying at that time. He notes good flow afterwards. 2. Incomplete bladder emptying     Overview      Large residual volumes. He is s/p LEMP on 12/17/2020. Doing well after. Current Assessment & Plan       History of large urinary residuals improved after laser enucleation of the prostate December 2020. 3. Nocturia     Overview      He is up overnight to void. He is on diuretics. He is s/p LEMP 12/17/2020. Current Assessment & Plan      Nocturia. He does use diuretics         4.  Bladder cancer [facility]) - Primary     Overview      12/16/2020: He has a h/o bladder cancer in 2/2011.  5cm tumor resected by Dr. Vale Merlin.  Subsequent biopsies without evidence of recurrence, but with chronic cystitis. Annual cystoscopy. Current Assessment & Plan      History of bladder cancer in 2011. Here for annual cystoscopy today. He was found to have a urethral stricture which was dilated. Reconstruction of bladder revealed no obvious tumors however visualization was limited. Plan on follow-up cystoscopy in 3 months. Relevant Orders     AMB POC URINALYSIS DIP STICK AUTO W/O MICRO    5. Atrial fibrillation (HCC)     Overview      On anticoagulation; Eliquis         6. 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 I was unable to pass the scope. Urethra was dilated and a [place] tip Suarez catheter was inserted over the wire. I will prescribe 1 week of Keflex 500 twice daily and have him follow-up in 1 week for catheter removal.          Relevant Orders     AMB POC URINALYSIS DIP STICK AUTO W/O MICRO          Past Medical History:    PMHx (including negatives):  has a past medical history of Arrhythmia, Arthritis, Cancer ([address] Cancer ([address] Chronic kidney disease, Elevated cholesterol, Glaucoma, Hematuria, Hypertension, and Kidney disorder. PSurgHx:  has a past surgical history that includes ir insert tunl cvc w port over 5 years; hx parathyroidectomy (1990); hx other surgical; hx heart catheterization (2009); hx colonoscopy; hx prostate surgery (12/17/2020); hx pacemaker (2013); hx pacemaker; hx urological (07/02/2019); hx urological (12/17/2020); and hx urological (03/22/2022). PSocHx:  reports that he has quit smoking. He quit after 20.00 years of use. He has never used smokeless tobacco. He reports previous alcohol use. He reports that he does not use drugs.      ROS  Physical Exam  Allergies   Allergen Reactions    Codeine Unknown (comments) hallucination    Doxycycline Unknown (comments)     Blurry vision      Prior to Admission medications    Medication Sig Start Date End Date Taking? Authorizing Provider   cephALEXin (KEFLEX) 500 mg capsule Take 1 Capsule by mouth three (3) times daily. 3/22/22  Yes Jag Prasad MD   amiodarone (CORDARONE) 200 mg tablet  10/11/20  Yes Provider, Historical   metoprolol tartrate (LOPRESSOR) 50 mg tablet TAKE 1 TABLET BY MOUTH TWICE A DAY WITH FOOD 8/13/20  Yes Provider, Historical   Eliquis 2.5 mg tablet TAKE 1 TABLET BY MOUTH TWICE A DAY 11/4/20  Yes Provider, Historical   simvastatin (ZOCOR) 20 mg tablet TAKE 1 TABLET BY MOUTH EVERYDAY AT BEDTIME 8/13/20  Yes Provider, Historical   torsemide (DEMADEX) 20 mg tablet TAKE 1 TABLET BY MOUTH EVERY DAY 11/2/20  Yes Provider, Historical   digoxin (LANOXIN) 0.125 mg tablet Takes 5x week. Sunday, Monday, Wednesday, Thursday, Saturday 9/19/20  Yes Provider, Historical   multivitamin (ONE A DAY) tablet Take 1 Tab by mouth daily. Yes Provider, Historical   ferrous sulfate (Iron) 325 mg (65 mg iron) tablet Take  by mouth Daily (before breakfast). Yes Provider, Historical        ASSESSMENT and PLAN  Diagnoses and all orders for this visit:    1. Malignant neoplasm of urinary bladder, unspecified site [facility])  Assessment & Plan:  History of bladder cancer in 2011. Here for annual cystoscopy today. He was found to have a urethral stricture which was dilated. Reconstruction of bladder revealed no obvious tumors however visualization was limited. Plan on follow-up cystoscopy in 3 months. Orders:  -     AMB POC URINALYSIS DIP STICK AUTO W/O MICRO    2. Atrial fibrillation, unspecified type ([address]    3. Benign prostatic hyperplasia with incomplete bladder emptying  Assessment & Plan:   History of laser enucleation of the prostate 12/17/2020. Eri Mcdermott He had incomplete emptying at that time. He notes good flow afterwards.       4. Incomplete bladder emptying  Assessment & Plan: History of large urinary residuals improved after laser enucleation of the prostate December 2020.      5. Nocturia  Assessment & Plan:  Nocturia. He does use diuretics      6. 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 I was unable to pass the scope. Urethra was dilated and a [place] tip Suarez catheter was inserted over the wire. I will prescribe 1 week of Keflex 500 twice daily and have him follow-up in 1 week for catheter removal.    Orders:  -     AMB POC URINALYSIS DIP STICK AUTO W/O MICRO    Other orders  -     cephALEXin (KEFLEX) 500 mg capsule; Take 1 Capsule by mouth three (3) times daily.          Lisa Forman MD

## 2022-03-22 NOTE — LETTER
Name: Melisa Tillman      1935   MRN:  [id number]           PROCEDURAL INFORMED CONSENT FOR OPERATION / PROCEDURE    1. I (we),Merlin Kraft  authorize Mesfin Bradshaw MD     and/or such assistants as may be selected by him/her, to perform the following operation/procedures    CYSTOSCOPY URETHRAL DILATION    Note: If unable to obtain consent prior to an emergent procedure, document the emergent reason in the medical record. This procedure has been explained to my (our) satisfaction and included in the explanation was:   A) the intended benefit, nature, and extent of the procedure to be performed;  B) the significant risks involved and the probability of success;  C) alternative procedures and methods of treatment;  D) the dangers and probable consequences of such alternatives (including no procedure or treatment); E) the expected consequences of the procedure on my future health;  F) whether other qualified individuals would be performing important surgical tasks and / or whether  would be present to advise or support the procedure. I (we) understand that there are other risks of infection and other serious complications in the pre-operative/procedural and postoperative/procedural stages of my (our) care. I (we) have asked all of the questions which I (we) thought were important in deciding whether or not to undergo treatment or diagnosis. These questions have been answered to my (our) satisfaction. I (we) understand that no assurance can be given that the procedure will be a success, and no guarantee or warranty of success has been given to me (us). 2. It has been explained to me (us) that during the course of the operation/procedure, unforeseen conditions may be revealed that necessitate extension of the original procedure(s) or different procedure(s) than those set forth in Paragraph 1.  I (we) authorize and request that the above-named physician, his/her assistants or his/her designees, perform procedures as necessary and desirable if deemed to be in my (our) best interest.    3. I acknowledge that other health care personnel may be observing this procedure for the purpose of medical education or other specified purposes as may be necessary as requested and/or approved by my (our) physician. 4. I (we) consent to the disposal by the hospital Pathologist of the removed tissue, parts or organs in accordance with hospital policy. 5. I do_____ do not______ consent to the use of a local infiltration pain blocking agent that will be used by my provider/surgical provider to help alleviate pain during my procedure. 6. I do_____ do not_____ consent to an emergent blood transfusion in the case of a life-threatening situation that requires blood components to be administered. This consent is valid for 24 hours from the beginning of the procedure. 7. This patient does _____ or does not ______currently have a DNR status/order. If DNR order is in place, obtain Addendum to the Surgical Consent for ALL Patients with a DNR Order to address diana-operative status for limited intervention or DNR suspension. 8. I have read and fully understand the above Consent for Operation/Procedure and that all blanks were completed before I signed the consent.       Signature of Patient or legal representative ______________________________             Printed Name / Relationship                          _______________________________     Date / Time   ________________________ []AM []PM         Witness to Signature ________________________     Printed Name ______________________      Date / Time  _______________________  []AM []PM     (If patient is unable to sign or is a minor, complete the following)           Patient is a minor, ____years of age, or unable to sign because: ___________________             Theodoro Milder If a phone consent is obtained, consent will be documented by using two health care professionals, each affirming that the consenting party   has no questions and gives consent for the procedure discussed with the physician/provider. 2nd witness to phone consent ________________________    Printed name _________________________    Date / Time  _______________________  []AM []PM         Informed consent:  I have provided the explanation described above in section 1 to the patient and/or legal representative. I have provided the patient and/or legal representative with an opportunity to ask any questions about the proposed operation/procedure.       Provider / Ivy Carrion ______________________            Printed Name  __________________________           Date / Time  _______________________  []AM []PM    This Provider/Proceduralist performing the surgery is ONLY for [employer]fice  based Procedures in [place]

## 2022-03-22 NOTE — ASSESSMENT & PLAN NOTE
On cystoscopy today he was found of a dense stricture in the prostatic urethra. I was unable to pass the scope. Urethra was dilated and a [place] tip Suarez catheter was inserted over the wire.   I will prescribe 1 week of Keflex 500 twice daily and have him follow-up in 1 week for catheter removal.    Hold Eliquis until hematuria resolves

## 2022-03-22 NOTE — LETTER
3/22/2022    Patient: Olivia Wright   YOB: 1935   Date of Visit: 3/22/2022     Laurie Becker MD  [address]  [address] [address]  [facility] [address]    Dear Laurie Becker MD,      Thank you for referring Mr. Melissa Cr to [place] [address] for evaluation. My notes for this consultation are attached. If you have questions, please do not hesitate to call me. I look forward to following your patient along with you.       Sincerely,    Tate Villafana MD

## 2022-03-24 PROBLEM — N99.116 POSTPROCEDURAL STRICTURE OF OVERLAPPING SITES OF URETHRA IN MALE: Status: ACTIVE | Noted: 2022-03-22

## 2022-03-28 PROBLEM — N35.919 STRICTURE OF MALE URETHRA: Status: ACTIVE | Noted: 2022-03-28

## 2022-03-28 PROBLEM — N99.116 POSTPROCEDURAL STRICTURE OF OVERLAPPING SITES OF URETHRA IN MALE: Status: RESOLVED | Noted: 2022-03-22 | Resolved: 2022-03-28

## 2022-03-28 NOTE — PROGRESS NOTES
HISTORY OF PRESENT ILLNESS  Alber Hutton is a 80 y.o. male. Chief Complaint   Patient presents with    New Patient    Voiding Trial     cath removal        The patient is here today for a voiding trial following urethral dilation in clinic on 3/22/22. He has hx of large residuals prior to LEMP in 2020. On routine cystoscopy Dr. Binnie Cranker was unable to pass the scope. Eri Chance sounds were used to dilate from 12F to 20F. Scope was then passed. 18F alanis placed for 1 week. Gross hematuria afterward. No voiding trial; Alanis removed secondary to lack of supplies. No continued hematuria per patient. He was provided information on what to do and how to seek care if he is unable to void after leaving the office. He was encouraged to hydrate well. All of his questions were answered. Chronic Conditions Addressed Today     1. Incomplete bladder emptying     Overview      Large residual volumes. He is s/p LEMP on 12/17/2020. Doing well after. 3/22/22: large residual, unable to pass scope. Eri Chance sounds used to dilate from 12F to 20F. Scope then able to be passed. Still glide wire inserted via the scope. 18F alanis placed for 1 week. Current Assessment & Plan      He is s/p dilation of of the urethra in clinic. Alanis removed today. He is provided information on what to do if he is unable to void. He is provided information and catheters for self-catheterization if he is unable to void. We will follow up in 8 weeks to reassess voiding and emptying. Sooner if necessary. Prophylactic abx x 5 days. 2. Stricture of male urethra - Primary     Overview      3/22/22: large residual, unable to pass scope. Eri Chance sounds used to dilate from 12F to 20F. Scope then able to be passed. Still glide wire inserted via the scope. Dense; prostatic urethra. 18F alanis placed for 1 week.           Current Assessment & Plan      He is s/p dilation of of the urethra in clinic. Suarez removed today. He is provided information on what to do if he is unable to void. He is provided information and catheters for self-catheterization if he is unable to void. We will follow up in 8 weeks to reassess voiding and emptying. Sooner if necessary. Prophylactic abx x 5 days. Review of Systems   Constitutional: Negative for chills and fever. Gastrointestinal: Negative for nausea and vomiting. Patient denies the symptoms of COVID-19 per routine screening guidelines. ASSESSMENT and PLAN  Diagnoses and all orders for this visit:    1. Stricture of male urethra, unspecified stricture type  Assessment & Plan:  He is s/p dilation of of the urethra in clinic. Suarez removed today. He is provided information on what to do if he is unable to void. He is provided information and catheters for self-catheterization if he is unable to void. We will follow up in 8 weeks to reassess voiding and emptying. Sooner if necessary. Prophylactic abx x 5 days. 2. Incomplete bladder emptying  Assessment & Plan:  He is s/p dilation of of the urethra in clinic. Suarez removed today. He is provided information on what to do if he is unable to void. He is provided information and catheters for self-catheterization if he is unable to void. We will follow up in 8 weeks to reassess voiding and emptying. Sooner if necessary. Prophylactic abx x 5 days. Other orders  -     cephALEXin (KEFLEX) 500 mg capsule; Take 1 Capsule by mouth three (3) times daily for 5 days. Follow-up and Dispositions    · Return in about 8 weeks (around 5/24/2022) for with Dr. Amaury Doyle; reassess voiding; please print AVS.       Patient given written information on how to perform self-catheterization. He is not overly willing to perform this. He is also provided information via AVS and watched the instructional video in clinic. He had the opportunity to ask questions.      He will return in 3 months. We discussed an 8 week follow up to reassess, but patient had some difficulty with travel and prefers to come at the 3 month previously scheduled lexus. He will call us with problems.   Danica Khan NP

## 2022-03-29 ENCOUNTER — OFFICE VISIT (OUTPATIENT)
Dept: UROLOGY | Age: 87
End: 2022-03-29
Payer: MEDICARE

## 2022-03-29 VITALS
RESPIRATION RATE: 16 BRPM | DIASTOLIC BLOOD PRESSURE: 51 MMHG | TEMPERATURE: 97.3 F | OXYGEN SATURATION: 95 % | SYSTOLIC BLOOD PRESSURE: 112 MMHG | HEART RATE: 65 BPM

## 2022-03-29 DIAGNOSIS — R33.9 INCOMPLETE BLADDER EMPTYING: ICD-10-CM

## 2022-03-29 DIAGNOSIS — N35.919 STRICTURE OF MALE URETHRA, UNSPECIFIED STRICTURE TYPE: Primary | ICD-10-CM

## 2022-03-29 PROCEDURE — G8432 DEP SCR NOT DOC, RNG: HCPCS | Performed by: NURSE PRACTITIONER

## 2022-03-29 PROCEDURE — 99212 OFFICE O/P EST SF 10 MIN: CPT | Performed by: NURSE PRACTITIONER

## 2022-03-29 PROCEDURE — G8427 DOCREV CUR MEDS BY ELIG CLIN: HCPCS | Performed by: NURSE PRACTITIONER

## 2022-03-29 PROCEDURE — G8417 CALC BMI ABV UP PARAM F/U: HCPCS | Performed by: NURSE PRACTITIONER

## 2022-03-29 PROCEDURE — G8536 NO DOC ELDER MAL SCRN: HCPCS | Performed by: NURSE PRACTITIONER

## 2022-03-29 RX ORDER — TIZANIDINE 4 MG/1
TABLET ORAL
COMMUNITY
Start: 2022-03-17 | End: 2022-08-26

## 2022-03-29 RX ORDER — CEPHALEXIN 500 MG/1
500 CAPSULE ORAL 3 TIMES DAILY
Qty: 9 CAPSULE | Refills: 0 | Status: SHIPPED | OUTPATIENT
Start: 2022-03-29 | End: 2022-03-29

## 2022-03-29 RX ORDER — CEPHALEXIN 500 MG/1
500 CAPSULE ORAL 3 TIMES DAILY
Qty: 15 CAPSULE | Refills: 0 | Status: SHIPPED | OUTPATIENT
Start: 2022-03-29 | End: 2022-04-03

## 2022-03-29 NOTE — ASSESSMENT & PLAN NOTE
He is s/p dilation of of the urethra in clinic. Suarez removed today. He is provided information on what to do if he is unable to void. He is provided information and catheters for self-catheterization if he is unable to void. We will follow up in 8 weeks to reassess voiding and emptying. Sooner if necessary. Prophylactic abx x 5 days.

## 2022-03-29 NOTE — PATIENT INSTRUCTIONS
FOLLOW UP REMINDER FROM YOUR SPECIALTY PROVIDER:     Your healthcare provider today may have received a reminder for a \"due or due soon\" health maintenance task. You should contact your primary care provider for follow-up on this health maintenance or other necessary and/or routine health screening. Routine health screenings such as labs, mammogram, or colonoscopy are important to your overall health. Your primary care provider can help to ensure that you are meeting all of your healthcare goals. WHAT TO DO IF YOU CAN'T URINATE AFTER YOUR VOIDING TRIAL. · Ensure you are drinking enough water; avoid caffeine and sugary beverages. · Give yourself 8 hours after leaving the office. If you are well-hydrated and you haven't urinated, please go to the Emergency Room or Urgent Care center and let them know you can't urinate. · If you have to have a catheter put back in, call the office and notify the nurse or Nurse Practitioner. You will have to leave that in place for 5-7 days and we will see you back in the office after that. How to Use a Straight Catheter for Self-Catheterization (Male): Care Instructions    Overview  Self-catheterization is a way to completely empty your bladder when you need to. You put a thin tube called a catheter into your bladder. This lets the urine flow out. You may use a catheter if you have nerve damage, a problem with your urinary tract, or diseases that weaken your muscles. Emptying your bladder regularly can prevent urine leaks during the day. It can also prevent kidney damage from blocked urine and infections. An intermittent, or straight, urinary catheter is taken out right after it is used. Straight catheters come in different lengths and types. They are used one time only. Disposable catheters can be thrown away after each use. You can empty your bladder every 4 to 6 hours, or as your doctor recommends.  It takes practice to learn how to place the catheter. It may be uncomfortable at first, but it should not cause pain. If your doctor asks you to measure your urine, you can catch it in a container that your doctor gives you. Note the amount of urine, and the date and time. It's very important to stay clean when you use the catheter. This helps prevent infection. Keep your hands, the catheter, and the area around your urethra clean. (When you urinate, the urethra carries urine from the bladder to the outside of the body through the penis.)    Follow-up care is a key part of your treatment and safety. Be sure to make and go to all appointments, and call your doctor if you are having problems. It's also a good idea to know your test results and keep a list of the medicines you take. How can you care for yourself at home? How do you safely use a straight catheter (male)? 1. When you need to empty your bladder, try to urinate first, if you can, before you use the catheter. 2. Gather the supplies you need to insert the catheter. You will need:  3. You may want to use a clean washcloth or towel, and a bag or plastic tub to hold the supplies. 4. Wash and dry your hands. · The catheter. · A container to hold the urine. (If you empty the urine right into the toilet, you won't need the container.)  · Lubricating jelly, such as K-Y Jelly, that dissolves in water. Don't use a petroleum jelly such as Vaseline. Clean the area around the urethra   1. Place the urine container between your legs (if you are using one). 2. Clean the end of your penis well with soap and water. (If you are not circumcised, clean under the foreskin too.)  3. Spread the lubricating jelly on the tip of the catheter. Put the other end of the catheter over the toilet bowl or in the container to catch the urine. Insert the catheter   1. Gently insert the catheter into the urethra opening on the penis. Move the catheter in until urine begins to flow out.  Then insert it about 1 inch more. 2. Let the urine drain into the container or the toilet. Drain bladder and remove the catheter   1. Remove the catheter slowly. If you are using a disposable catheter, throw it away. If not, wash the catheter with warm, soapy water. Dry it and put it into a clean container. 2. Wash and dry your hands. When should you call for help? Call your doctor now or seek immediate medical care if:    · You have symptoms of a urinary tract infection. These may include:  ? Pain or burning when you urinate. ? A frequent need to urinate without being able to pass much urine. ? Pain in the flank, which is just below the rib cage and above the waist on either side of the back. ? Blood or pus in your urine. ? A fever. · Your urine smells bad. · You can't pass any urine. Watch closely for changes in your health, and be sure to contact your doctor if you have any problems.

## 2022-03-29 NOTE — PROGRESS NOTES
Chief Complaint   Patient presents with    Follow-up    Urethral Stricture    Incomplete Bladder Emptying     Cath removal       PHQ-9 score is    Negative    Vitals:    03/29/22 1122   BP: (!) 112/51   Pulse: 65   Resp: 16   Temp: 97.3 °F (36.3 °C)   TempSrc: Temporal   SpO2: 95%   PainSc:   0 - No pain      1. \"Have you been to the ER, urgent care clinic since your last visit? Hospitalized since your last visit? \" No    2. \"Have you seen or consulted any other health care providers outside of the [address] [place] [address] since your last visit? \" No     3. For patients aged 39-70: Has the patient had a colonoscopy / FIT/ Cologuard? Yes - no Care Gap present      If the patient is female:    4. For patients aged 41-77: Has the patient had a mammogram within the past 2 years? NA - based on age or sex      11. For patients aged 21-65: Has the patient had a pap smear?  NA - based on age or sex

## 2022-08-11 ENCOUNTER — TRANSCRIBE ORDER (OUTPATIENT)
Dept: SCHEDULING | Age: 87
End: 2022-08-11

## 2022-08-11 DIAGNOSIS — R09.89 DIMINISHED PULSE: Primary | ICD-10-CM

## 2022-08-15 ENCOUNTER — HOSPITAL ENCOUNTER (OUTPATIENT)
Dept: INFUSION THERAPY | Age: 87
Discharge: HOME | End: 2022-08-15
Payer: MEDICARE

## 2022-08-15 VITALS
TEMPERATURE: 97.8 F | SYSTOLIC BLOOD PRESSURE: 110 MMHG | RESPIRATION RATE: 18 BRPM | HEART RATE: 68 BPM | DIASTOLIC BLOOD PRESSURE: 66 MMHG

## 2022-08-15 LAB — HISTORY CHECKED?,CKHIST: NORMAL

## 2022-08-15 PROCEDURE — 86900 BLOOD TYPING SEROLOGIC ABO: CPT

## 2022-08-15 PROCEDURE — 36415 COLL VENOUS BLD VENIPUNCTURE: CPT

## 2022-08-15 PROCEDURE — 86923 COMPATIBILITY TEST ELECTRIC: CPT

## 2022-08-15 RX ORDER — DIPHENHYDRAMINE HCL 25 MG
25 CAPSULE ORAL ONCE
Status: COMPLETED | OUTPATIENT
Start: 2022-08-16 | End: 2022-08-16

## 2022-08-15 RX ORDER — SODIUM CHLORIDE 9 MG/ML
250 INJECTION, SOLUTION INTRAVENOUS AS NEEDED
Status: CANCELLED | OUTPATIENT
Start: 2022-08-15

## 2022-08-15 RX ORDER — ACETAMINOPHEN 325 MG/1
650 TABLET ORAL ONCE
Status: COMPLETED | OUTPATIENT
Start: 2022-08-16 | End: 2022-08-16

## 2022-08-15 RX ORDER — SODIUM CHLORIDE 9 MG/ML
25 INJECTION, SOLUTION INTRAVENOUS CONTINUOUS
Status: DISPENSED | OUTPATIENT
Start: 2022-08-16 | End: 2022-08-16

## 2022-08-15 NOTE — PROGRESS NOTES
[facility] Lab visit:     2927: Patient arrived ambulatory and in no distress. Labs drawn per Rony Iqbal RN. Departed [facility] ambulatory and in no distress. Visit Vitals:  Visit Vitals  /66 (BP 1 Location: Left upper arm, BP Patient Position: Sitting)   Pulse 68   Temp 97.8 °F (36.6 °C)   Resp 18       Labs: See CC for pending results.

## 2022-08-16 ENCOUNTER — HOSPITAL ENCOUNTER (OUTPATIENT)
Dept: INFUSION THERAPY | Age: 87
Discharge: HOME | End: 2022-08-16
Payer: MEDICARE

## 2022-08-16 VITALS
SYSTOLIC BLOOD PRESSURE: 102 MMHG | RESPIRATION RATE: 18 BRPM | OXYGEN SATURATION: 94 % | TEMPERATURE: 98 F | HEART RATE: 60 BPM | DIASTOLIC BLOOD PRESSURE: 58 MMHG

## 2022-08-16 LAB
BASOPHILS # BLD: 0 K/UL (ref 0–0.1)
BASOPHILS NFR BLD: 1 % (ref 0–1)
DIFFERENTIAL METHOD BLD: ABNORMAL
EOSINOPHIL # BLD: 0.1 K/UL (ref 0–0.4)
EOSINOPHIL NFR BLD: 2 % (ref 0–7)
ERYTHROCYTE [DISTWIDTH] IN BLOOD BY AUTOMATED COUNT: 15 % (ref 11.5–14.5)
HCT VFR BLD AUTO: 24 % (ref 36.6–50.3)
HGB BLD-MCNC: 7.8 G/DL (ref 12.1–17)
IMM GRANULOCYTES # BLD AUTO: 0 K/UL (ref 0–0.04)
IMM GRANULOCYTES NFR BLD AUTO: 0 % (ref 0–0.5)
LYMPHOCYTES # BLD: 0.5 K/UL (ref 0.8–3.5)
LYMPHOCYTES NFR BLD: 14 % (ref 12–49)
MCH RBC QN AUTO: 28.1 PG (ref 26–34)
MCHC RBC AUTO-ENTMCNC: 32.5 G/DL (ref 30–36.5)
MCV RBC AUTO: 86.3 FL (ref 80–99)
MONOCYTES # BLD: 0.8 K/UL (ref 0–1)
MONOCYTES NFR BLD: 21 % (ref 5–13)
NEUTS SEG # BLD: 2.4 K/UL (ref 1.8–8)
NEUTS SEG NFR BLD: 62 % (ref 32–75)
NRBC # BLD: 0 K/UL (ref 0–0.01)
NRBC BLD-RTO: 0 PER 100 WBC
PLATELET # BLD AUTO: 141 K/UL (ref 150–400)
PMV BLD AUTO: 10.4 FL (ref 8.9–12.9)
RBC # BLD AUTO: 2.78 M/UL (ref 4.1–5.7)
RBC MORPH BLD: ABNORMAL
WBC # BLD AUTO: 3.8 K/UL (ref 4.1–11.1)

## 2022-08-16 PROCEDURE — 74011250636 HC RX REV CODE- 250/636: Performed by: INTERNAL MEDICINE

## 2022-08-16 PROCEDURE — 36430 TRANSFUSION BLD/BLD COMPNT: CPT

## 2022-08-16 PROCEDURE — P9016 RBC LEUKOCYTES REDUCED: HCPCS

## 2022-08-16 PROCEDURE — 74011000250 HC RX REV CODE- 250: Performed by: INTERNAL MEDICINE

## 2022-08-16 PROCEDURE — 74011250637 HC RX REV CODE- 250/637: Performed by: INTERNAL MEDICINE

## 2022-08-16 PROCEDURE — 77030013169 SET IV BLD ICUM -A

## 2022-08-16 PROCEDURE — 85025 COMPLETE CBC W/AUTO DIFF WBC: CPT

## 2022-08-16 PROCEDURE — 36415 COLL VENOUS BLD VENIPUNCTURE: CPT

## 2022-08-16 RX ORDER — SODIUM CHLORIDE 9 MG/ML
10 INJECTION INTRAMUSCULAR; INTRAVENOUS; SUBCUTANEOUS AS NEEDED
Status: DISCONTINUED | OUTPATIENT
Start: 2022-08-16 | End: 2022-08-17 | Stop reason: HOSPADM

## 2022-08-16 RX ORDER — HEPARIN 100 UNIT/ML
500 SYRINGE INTRAVENOUS AS NEEDED
Status: DISCONTINUED | OUTPATIENT
Start: 2022-08-16 | End: 2022-08-17 | Stop reason: HOSPADM

## 2022-08-16 RX ORDER — SODIUM CHLORIDE 9 MG/ML
250 INJECTION, SOLUTION INTRAVENOUS AS NEEDED
Status: DISCONTINUED | OUTPATIENT
Start: 2022-08-16 | End: 2022-08-17 | Stop reason: HOSPADM

## 2022-08-16 RX ORDER — SODIUM CHLORIDE 0.9 % (FLUSH) 0.9 %
10 SYRINGE (ML) INJECTION AS NEEDED
Status: DISCONTINUED | OUTPATIENT
Start: 2022-08-16 | End: 2022-08-17 | Stop reason: HOSPADM

## 2022-08-16 RX ADMIN — SODIUM CHLORIDE 25 ML/HR: 9 INJECTION, SOLUTION INTRAVENOUS at 09:00

## 2022-08-16 RX ADMIN — SODIUM CHLORIDE, PRESERVATIVE FREE 10 ML: 5 INJECTION INTRAVENOUS at 12:14

## 2022-08-16 RX ADMIN — DIPHENHYDRAMINE HYDROCHLORIDE 25 MG: 25 CAPSULE ORAL at 08:59

## 2022-08-16 RX ADMIN — ACETAMINOPHEN 650 MG: 325 TABLET ORAL at 08:59

## 2022-08-16 RX ADMIN — SODIUM CHLORIDE 10 ML: 9 INJECTION, SOLUTION INTRAMUSCULAR; INTRAVENOUS; SUBCUTANEOUS at 08:55

## 2022-08-16 RX ADMIN — HEPARIN 500 UNITS: 100 SYRINGE at 12:15

## 2022-08-16 NOTE — PROGRESS NOTES
[address] [facility] @ [facility] VISIT NOTE    3537 Patient arrives for Blood Transfusion without acute problems. Please see connect care for complete assessment and education provided. All central lines follow the [facility]. Vital signs stable throughout and prior to discharge, Pt. Tolerated treatment well and discharged without incident. Patient/daughter is aware of no further [facility] appointments @ this time & to follow up with healthcare provider for next appointment. Medications Verified by Jesus Brown RN & Clyde Cruz RN:  1. PRBC's 1 unit   2. Tylenol 650 mg po pre-infusion  3. Benadryl 25 mg po pre-infusion  4. NS IV Flush & KVO via Port  5. Heparin 500 units IV Flush via [address]   Patient Vitals for the past 12 hrs:   Temp Pulse Resp BP SpO2   08/16/22 1215 98 °F (36.7 °C) 60 18 (!) 102/58 --   08/16/22 1159 97.8 °F (36.6 °C) 60 18 (!) 112/59 --   08/16/22 1050 98 °F (36.7 °C) 62 20 117/60 --   08/16/22 0955 97.4 °F (36.3 °C) 60 20 (!) 94/54 --   08/16/22 0925 98.1 °F (36.7 °C) 60 20 (!) 115/58 --   08/16/22 0842 98.1 °F (36.7 °C) 62 20 (!) 103/52 94 %      LAB WORK   Recent Results (from the past 12 hour(s))   CBC WITH AUTOMATED DIFF    Collection Time: 08/16/22 12:13 PM   Result Value Ref Range    WBC 3.8 (L) 4.1 - 11.1 K/uL    RBC 2.78 (L) 4.10 - 5.70 M/uL    HGB 7.8 (L) 12.1 - 17.0 g/dL    HCT 24.0 (L) 36.6 - 50.3 %    MCV 86.3 80.0 - 99.0 FL    MCH 28.1 26.0 - 34.0 PG    MCHC 32.5 30.0 - 36.5 g/dL    RDW 15.0 (H) 11.5 - 14.5 %    PLATELET 773 (L) 587 - 400 K/uL    MPV 10.4 8.9 - 12.9 FL    NRBC 0.0 0  WBC    ABSOLUTE NRBC 0.00 0.00 - 0.01 K/uL    NEUTROPHILS PENDING %    LYMPHOCYTES PENDING %    MONOCYTES PENDING %    EOSINOPHILS PENDING %    BASOPHILS PENDING %    IMMATURE GRANULOCYTES PENDING %    ABS. NEUTROPHILS PENDING K/UL    ABS. LYMPHOCYTES PENDING K/UL    ABS. MONOCYTES PENDING K/UL    ABS. EOSINOPHILS PENDING K/UL    ABS.  BASOPHILS PENDING K/UL    ABS. IMM. GRANS. PENDING K/UL    DF PENDING       .

## 2022-08-17 LAB
ABO + RH BLD: NORMAL
BLD PROD TYP BPU: NORMAL
BLOOD GROUP ANTIBODIES SERPL: NORMAL
BPU ID: NORMAL
CROSSMATCH RESULT,%XM: NORMAL
SPECIMEN EXP DATE BLD: NORMAL
STATUS OF UNIT,%ST: NORMAL
UNIT DIVISION, %UDIV: 0

## 2022-08-25 ENCOUNTER — HOSPITAL ENCOUNTER (OUTPATIENT)
Dept: INFUSION THERAPY | Age: 87
Discharge: HOME | End: 2022-08-25
Payer: MEDICARE

## 2022-08-25 VITALS
OXYGEN SATURATION: 99 % | DIASTOLIC BLOOD PRESSURE: 52 MMHG | HEART RATE: 61 BPM | TEMPERATURE: 97.9 F | SYSTOLIC BLOOD PRESSURE: 101 MMHG | RESPIRATION RATE: 18 BRPM

## 2022-08-25 LAB — HISTORY CHECKED?,CKHIST: NORMAL

## 2022-08-25 PROCEDURE — 86900 BLOOD TYPING SEROLOGIC ABO: CPT

## 2022-08-25 PROCEDURE — 86923 COMPATIBILITY TEST ELECTRIC: CPT

## 2022-08-25 PROCEDURE — 36415 COLL VENOUS BLD VENIPUNCTURE: CPT

## 2022-08-25 RX ORDER — DIPHENHYDRAMINE HCL 25 MG
25 CAPSULE ORAL ONCE
Status: COMPLETED | OUTPATIENT
Start: 2022-08-26 | End: 2022-08-26

## 2022-08-25 RX ORDER — ACETAMINOPHEN 325 MG/1
650 TABLET ORAL ONCE
Status: COMPLETED | OUTPATIENT
Start: 2022-08-26 | End: 2022-08-26

## 2022-08-25 NOTE — PROGRESS NOTES
[facility] Progress Note  Date: August 25, 2022    Name: Gautam Buckley [address] [facility] Short Visit Note:    2317:  Pt arrived via wheelchair and in no distress for T&C. Labs drawn peripherally from R A/C. D/Cd from [facility] via wheelchair and in no distress. Patient is aware of appt tomorrow at 0900. Advised patient to take any morning meds and stay hydrated.     Patient Vitals for the past 12 hrs:   Temp Pulse Resp BP SpO2   08/25/22 1348 97.9 °F (36.6 °C) 61 18 (!) 101/52 99 %       Future Appointments   Date Time Provider Lidya Torre   8/26/2022  9:00  [facility] [address] [place] [address]       Chante Jauregui RN  August 25, 2022

## 2022-08-26 ENCOUNTER — HOSPITAL ENCOUNTER (OUTPATIENT)
Dept: INFUSION THERAPY | Age: 87
Discharge: HOME | End: 2022-08-26
Payer: MEDICARE

## 2022-08-26 VITALS
SYSTOLIC BLOOD PRESSURE: 101 MMHG | DIASTOLIC BLOOD PRESSURE: 57 MMHG | RESPIRATION RATE: 18 BRPM | OXYGEN SATURATION: 98 % | HEART RATE: 61 BPM | TEMPERATURE: 98.4 F

## 2022-08-26 LAB
BASOPHILS # BLD: 0 K/UL (ref 0–0.1)
BASOPHILS NFR BLD: 1 % (ref 0–1)
DIFFERENTIAL METHOD BLD: ABNORMAL
EOSINOPHIL # BLD: 0 K/UL (ref 0–0.4)
EOSINOPHIL NFR BLD: 1 % (ref 0–7)
ERYTHROCYTE [DISTWIDTH] IN BLOOD BY AUTOMATED COUNT: 17.2 % (ref 11.5–14.5)
HCT VFR BLD AUTO: 21.8 % (ref 36.6–50.3)
HGB BLD-MCNC: 6.7 G/DL (ref 12.1–17)
IMM GRANULOCYTES # BLD AUTO: 0 K/UL (ref 0–0.04)
IMM GRANULOCYTES NFR BLD AUTO: 0 % (ref 0–0.5)
LYMPHOCYTES # BLD: 0.4 K/UL (ref 0.8–3.5)
LYMPHOCYTES NFR BLD: 9 % (ref 12–49)
MCH RBC QN AUTO: 27.9 PG (ref 26–34)
MCHC RBC AUTO-ENTMCNC: 30.7 G/DL (ref 30–36.5)
MCV RBC AUTO: 90.8 FL (ref 80–99)
MONOCYTES # BLD: 0.5 K/UL (ref 0–1)
MONOCYTES NFR BLD: 13 % (ref 5–13)
NEUTS SEG # BLD: 3.3 K/UL (ref 1.8–8)
NEUTS SEG NFR BLD: 76 % (ref 32–75)
NRBC # BLD: 0 K/UL (ref 0–0.01)
NRBC BLD-RTO: 0 PER 100 WBC
PLATELET # BLD AUTO: 126 K/UL (ref 150–400)
PMV BLD AUTO: 9.4 FL (ref 8.9–12.9)
RBC # BLD AUTO: 2.4 M/UL (ref 4.1–5.7)
RBC MORPH BLD: ABNORMAL
WBC # BLD AUTO: 4.2 K/UL (ref 4.1–11.1)

## 2022-08-26 PROCEDURE — 85025 COMPLETE CBC W/AUTO DIFF WBC: CPT

## 2022-08-26 PROCEDURE — 74011250636 HC RX REV CODE- 250/636: Performed by: INTERNAL MEDICINE

## 2022-08-26 PROCEDURE — 36430 TRANSFUSION BLD/BLD COMPNT: CPT

## 2022-08-26 PROCEDURE — P9016 RBC LEUKOCYTES REDUCED: HCPCS

## 2022-08-26 PROCEDURE — 77030012965 HC NDL HUBR BBMI -A

## 2022-08-26 PROCEDURE — 77030013169 SET IV BLD ICUM -A

## 2022-08-26 PROCEDURE — 74011000250 HC RX REV CODE- 250: Performed by: INTERNAL MEDICINE

## 2022-08-26 PROCEDURE — 74011250637 HC RX REV CODE- 250/637: Performed by: NURSE PRACTITIONER

## 2022-08-26 RX ORDER — SODIUM CHLORIDE 9 MG/ML
250 INJECTION, SOLUTION INTRAVENOUS AS NEEDED
Status: DISCONTINUED | OUTPATIENT
Start: 2022-08-26 | End: 2022-08-28 | Stop reason: HOSPADM

## 2022-08-26 RX ORDER — FUROSEMIDE 80 MG/1
80 TABLET ORAL DAILY
COMMUNITY

## 2022-08-26 RX ORDER — SODIUM CHLORIDE 0.9 % (FLUSH) 0.9 %
10 SYRINGE (ML) INJECTION AS NEEDED
Status: DISCONTINUED | OUTPATIENT
Start: 2022-08-26 | End: 2022-08-27 | Stop reason: HOSPADM

## 2022-08-26 RX ORDER — HEPARIN 100 UNIT/ML
500 SYRINGE INTRAVENOUS AS NEEDED
Status: DISCONTINUED | OUTPATIENT
Start: 2022-08-26 | End: 2022-08-27 | Stop reason: HOSPADM

## 2022-08-26 RX ADMIN — DIPHENHYDRAMINE HYDROCHLORIDE 25 MG: 25 CAPSULE ORAL at 09:12

## 2022-08-26 RX ADMIN — SODIUM CHLORIDE 250 ML: 9 INJECTION, SOLUTION INTRAVENOUS at 08:25

## 2022-08-26 RX ADMIN — ACETAMINOPHEN 650 MG: 325 TABLET ORAL at 09:12

## 2022-08-26 RX ADMIN — Medication 10 ML: at 09:02

## 2022-08-26 NOTE — DISCHARGE INSTRUCTIONS
OUTPATIENT INFUSION CENTER    DISCHARGE INSTRUCTIONS FOR:  BLOOD TRANSFUSION    We hope you are feeling better after your blood transfusion. Some mild tenderness or slight bruising at your IV site is normal.  Avoid lifting or heavy use of that extremity for the rest of the day. Drink plenty of fluids, eat a normal diet and get some rest.    There are some important signs that you need to watch for in case you experience a delayed reaction to the blood you have received. Call your physician immediately if you develop any of the following symptoms:    1. Severe headache or backache;    2. Fever above 100 degrees;    3. Chills;    4. Difficulty breathing;    5.  Blood or red color in urine;    6. The feeling of weakness or constant fatigue;    7. Yellowing of the whites of your eyes or skin (jaundice). If your physician is not available, call or go to the nearest emergency room, or dial 911.     Willy Baldwin, Signature: ___________________________ 8/26/2022  David Tran RN

## 2022-08-26 NOTE — PROGRESS NOTES
[facility] Progress Note  August 26, 2022      3605  Irish Wang arrived via wheelchair and in no acute distress for 1 unit PRBCs. Patient COVID Screening completed:  Do you have any symptoms of COVID-19? SOB, coughing, fever, or generally not feeling well? NO  Have you been exposed to COVID-19 recently? NO  Have you had any recent contact with family/friend that has a pending COVID test? NO    Assessment completed, no new complaints voiced. R chest port accessed with positive blood return noted. Education provided regarding reason for blood transfusion and possible reactions. All questions and concerns regarding transfusion answered, patient voiced understanding. Problem: Infection - Risk of, Central Venous Catheter-Associated Bloodstream Infection  Goal: *Absence of infection signs and symptoms  Outcome: Progressing Towards Goal     Problem: Anemia Care Plan (Adult and Pediatric)  Goal: *Labs within defined limits  Outcome: Progressing Towards Goal     Problem: Patient Education:  Go to Education Activity  Goal: Patient/Family Education  Outcome: Progressing Towards Goal    Medications received:  NS @ KVO  Tylenol 650 mg PO  Benadryl 25 mg PO    0930:  1st unit PRBCs started and infusing without difficulty, observed x 15 minutes  1149:  1st unit completed without adverse reaction noted, NS flushing line. Patient Vitals for the past 12 hrs:   Temp Pulse Resp BP SpO2   08/26/22 1205 98.4 °F (36.9 °C) 61 18 (!) 101/57 --   08/26/22 0945 98.3 °F (36.8 °C) 60 18 (!) 97/44 --   08/26/22 0922 98.4 °F (36.9 °C) (!) 59 18 (!) 110/45 98 %       1210 Patient tolerated transfusion  well, no adverse reaction noted. D/C instructions reviewed, copy to pt, voiced understanding. Patient declined 1 hour post transfusion observation/vitals signs. Port flushed per protocol and de-accessed. D/Cd from [facility] via wheelchair and in no distress accompanied by daughter. No future appointments.     Samantha RAMIREZ Wilder Saucedo RN  August 26, 2022

## 2022-08-27 LAB
ABO + RH BLD: NORMAL
BLD PROD TYP BPU: NORMAL
BLD PROD TYP BPU: NORMAL
BLOOD GROUP ANTIBODIES SERPL: NORMAL
BPU ID: NORMAL
BPU ID: NORMAL
CROSSMATCH RESULT,%XM: NORMAL
CROSSMATCH RESULT,%XM: NORMAL
SPECIMEN EXP DATE BLD: NORMAL
STATUS OF UNIT,%ST: NORMAL
STATUS OF UNIT,%ST: NORMAL
UNIT DIVISION, %UDIV: 0
UNIT DIVISION, %UDIV: 0

## 2023-10-16 PROBLEM — R39.14 BENIGN PROSTATIC HYPERPLASIA WITH INCOMPLETE BLADDER EMPTYING: Status: ACTIVE | Noted: 2020-11-04

## 2023-10-16 PROBLEM — N35.919 STRICTURE OF MALE URETHRA: Status: ACTIVE | Noted: 2022-03-28

## 2023-10-16 PROBLEM — R33.9 INCOMPLETE BLADDER EMPTYING: Status: RESOLVED | Noted: 2020-11-04 | Resolved: 2023-10-16

## 2023-10-16 PROBLEM — N40.1 BENIGN PROSTATIC HYPERPLASIA WITH INCOMPLETE BLADDER EMPTYING: Status: ACTIVE | Noted: 2020-11-04

## 2023-10-16 PROBLEM — C67.9 BLADDER CANCER (HCC): Status: ACTIVE | Noted: 2020-11-04

## 2023-10-16 PROBLEM — R35.1 NOCTURIA: Status: RESOLVED | Noted: 2020-11-04 | Resolved: 2023-10-16

## 2023-10-31 NOTE — ASSESSMENT & PLAN NOTE
Due for annual cystoscopy in March, 2024. We will evaluate his bladder sooner with stricture treatment.

## 2023-11-08 ENCOUNTER — OFFICE VISIT (OUTPATIENT)
Age: 88
End: 2023-11-08
Payer: MEDICARE

## 2023-11-08 DIAGNOSIS — C67.9 MALIGNANT NEOPLASM OF URINARY BLADDER, UNSPECIFIED SITE (HCC): Primary | ICD-10-CM

## 2023-11-08 DIAGNOSIS — R39.14 BENIGN PROSTATIC HYPERPLASIA WITH INCOMPLETE BLADDER EMPTYING: ICD-10-CM

## 2023-11-08 DIAGNOSIS — N99.114 POSTPROCEDURAL MALE URETHRAL STRICTURE: ICD-10-CM

## 2023-11-08 DIAGNOSIS — N18.4 STAGE 4 CHRONIC KIDNEY DISEASE (HCC): ICD-10-CM

## 2023-11-08 DIAGNOSIS — N40.1 BENIGN PROSTATIC HYPERPLASIA WITH INCOMPLETE BLADDER EMPTYING: ICD-10-CM

## 2023-11-08 PROCEDURE — 1123F ACP DISCUSS/DSCN MKR DOCD: CPT | Performed by: UROLOGY

## 2023-11-08 PROCEDURE — 99214 OFFICE O/P EST MOD 30 MIN: CPT | Performed by: UROLOGY

## 2023-11-08 NOTE — PROGRESS NOTES
HISTORY OF PRESENT ILLNESS  Holly Sepulveda is a 80 y.o. male. has a past medical history of Arrhythmia, Arthritis, Cancer ([address]), Cancer ([address]), Chronic kidney disease, Elevated cholesterol, Glaucoma, Hematuria, Hypertension, and Kidney disorder. has a past surgical history that includes Colonoscopy; Cardiac catheterization (2009); other surgical history; parathyroidectomy (1990); IR PORT PLACEMENT > 5 YEARS; Prostate surgery (12/17/2020); pacemaker (2013); pacemaker; Urological Surgery (03/22/2022); Urological Surgery (12/17/2020); and Urological Surgery (07/02/2019). Chief Complaint   Patient presents with    1 Year Follow Up    Benign Prostatic Hypertrophy    Urinary Frequency at Night    Other     Incomplete bladder emptying     Bladder Cancer     Some slowing of the urine stream.  H/o Protouch 1470 laser enucleation of the prostate 2020. Ho urethral stricture dilated 2022. He feels his stream is slowing. He is more bothered by this. He had fluid overload and was in [facility] a year ago. He has afib. Benign Prostatic Hypertrophy        1. Malignant neoplasm of urinary bladder, unspecified site [facility])  Overview:  He has a h/o bladder cancer in 2/2011.  5cm tumor resected by Dr. Diann Domingo.  Subsequent biopsies without evidence of recurrence, but with chronic cystitis. Annual cystoscopy. Assessment & Plan:  Due for annual cystoscopy in March, 2024. We will evaluate his bladder sooner with stricture treatment. Orders:  -     AMB POC URINALYSIS DIP STICK AUTO W/O MICRO  2. Benign prostatic hyperplasia with incomplete bladder emptying  Overview:  He is s/p LEMP in 2020; voiding well after. Assessment & Plan:   He has not had an exam for a while. .   Orders:  -     AMB POC URINALYSIS DIP STICK AUTO W/O MICRO  -     PSA, Diagnostic; Future  3. 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 [employer] sounds used to dilate from 12F to 20F.  Dense

## 2023-11-09 LAB — PSA SERPL-MCNC: 1.3 NG/ML (ref 0–4)

## 2024-03-18 ENCOUNTER — TELEPHONE (OUTPATIENT)
Age: 89
End: 2024-03-18

## 2024-03-18 NOTE — TELEPHONE ENCOUNTER
Patient wanted to schedule surgery after the holidays but now he is recovering from a fall & stated he will call back to schedule.

## 2025-02-24 PROBLEM — N18.6 ESRD (END STAGE RENAL DISEASE) ON DIALYSIS (HCC): Status: ACTIVE | Noted: 2025-02-24

## 2025-02-24 PROBLEM — Z99.2 ESRD (END STAGE RENAL DISEASE) ON DIALYSIS (HCC): Status: ACTIVE | Noted: 2025-02-24

## 2025-02-24 PROBLEM — R09.02 HYPOXEMIA: Status: ACTIVE | Noted: 2025-02-24

## 2025-02-24 PROBLEM — C37 MALIGNANT NEOPLASM OF THYMUS (HCC): Status: ACTIVE | Noted: 2025-02-24

## 2025-02-24 PROBLEM — N18.4 STAGE 4 CHRONIC KIDNEY DISEASE (HCC): Status: RESOLVED | Noted: 2023-11-08 | Resolved: 2025-02-24

## 2025-07-09 ENCOUNTER — APPOINTMENT (OUTPATIENT)
Facility: HOSPITAL | Age: 89
End: 2025-07-09
Payer: MEDICARE

## 2025-07-09 ENCOUNTER — HOSPITAL ENCOUNTER (EMERGENCY)
Facility: HOSPITAL | Age: 89
Discharge: ANOTHER HOSPITAL | End: 2025-07-09
Attending: STUDENT IN AN ORGANIZED HEALTH CARE EDUCATION/TRAINING PROGRAM
Payer: MEDICARE

## 2025-07-09 VITALS
DIASTOLIC BLOOD PRESSURE: 47 MMHG | RESPIRATION RATE: 24 BRPM | HEIGHT: 75 IN | BODY MASS INDEX: 36.57 KG/M2 | WEIGHT: 294.1 LBS | SYSTOLIC BLOOD PRESSURE: 117 MMHG | OXYGEN SATURATION: 92 % | HEART RATE: 61 BPM | TEMPERATURE: 97.8 F

## 2025-07-09 DIAGNOSIS — I95.3 HEMODIALYSIS-ASSOCIATED HYPOTENSION: Primary | ICD-10-CM

## 2025-07-09 DIAGNOSIS — R79.89 ELEVATED TROPONIN: ICD-10-CM

## 2025-07-09 LAB
ALBUMIN SERPL-MCNC: 4.3 G/DL (ref 3.5–5.2)
ALBUMIN/GLOB SERPL: 2 (ref 1.1–2.2)
ALP SERPL-CCNC: 97 U/L (ref 40–129)
ALT SERPL-CCNC: 5 U/L (ref 10–50)
ANION GAP SERPL CALC-SCNC: 13 MMOL/L (ref 2–12)
AST SERPL-CCNC: 19 U/L (ref 10–50)
BASOPHILS # BLD: 0.02 K/UL (ref 0–0.1)
BASOPHILS NFR BLD: 0.2 % (ref 0–1)
BILIRUB SERPL-MCNC: 0.6 MG/DL (ref 0.2–1)
BUN SERPL-MCNC: 25 MG/DL (ref 8–23)
BUN/CREAT SERPL: 4 (ref 12–20)
CALCIUM SERPL-MCNC: 8.1 MG/DL (ref 8.8–10.2)
CHLORIDE SERPL-SCNC: 95 MMOL/L (ref 98–107)
CO2 SERPL-SCNC: 26 MMOL/L (ref 22–29)
COMMENT:: NORMAL
CREAT SERPL-MCNC: 5.99 MG/DL (ref 0.7–1.2)
DIFFERENTIAL METHOD BLD: ABNORMAL
EOSINOPHIL # BLD: 0.01 K/UL (ref 0–0.4)
EOSINOPHIL NFR BLD: 0.1 % (ref 0–7)
ERYTHROCYTE [DISTWIDTH] IN BLOOD BY AUTOMATED COUNT: 16.8 % (ref 11.5–14.5)
GLOBULIN SER CALC-MCNC: 2.1 G/DL (ref 2–4)
GLUCOSE SERPL-MCNC: 140 MG/DL (ref 65–100)
HCT VFR BLD AUTO: 34.3 % (ref 36.6–50.3)
HGB BLD-MCNC: 10.2 G/DL (ref 12.1–17)
IMM GRANULOCYTES # BLD AUTO: 0.05 K/UL (ref 0–0.04)
IMM GRANULOCYTES NFR BLD AUTO: 0.4 % (ref 0–0.5)
LACTATE BLD-SCNC: 1.5 MMOL/L (ref 0.4–2)
LYMPHOCYTES # BLD: 0.19 K/UL (ref 0.8–3.5)
LYMPHOCYTES NFR BLD: 1.6 % (ref 12–49)
MAGNESIUM SERPL-MCNC: 1.9 MG/DL (ref 1.6–2.4)
MCH RBC QN AUTO: 30.4 PG (ref 26–34)
MCHC RBC AUTO-ENTMCNC: 29.7 G/DL (ref 30–36.5)
MCV RBC AUTO: 102.1 FL (ref 80–99)
MONOCYTES # BLD: 1 K/UL (ref 0–1)
MONOCYTES NFR BLD: 8.5 % (ref 5–13)
NEUTS SEG # BLD: 10.53 K/UL (ref 1.8–8)
NEUTS SEG NFR BLD: 89.2 % (ref 32–75)
NRBC # BLD: 0.08 K/UL (ref 0–0.01)
NRBC BLD-RTO: 0.7 PER 100 WBC
NT PRO BNP: ABNORMAL PG/ML
PLATELET # BLD AUTO: 100 K/UL (ref 150–400)
PMV BLD AUTO: 9.3 FL (ref 8.9–12.9)
POTASSIUM SERPL-SCNC: 3.5 MMOL/L (ref 3.5–5.1)
PROT SERPL-MCNC: 6.4 G/DL (ref 6.4–8.3)
RBC # BLD AUTO: 3.36 M/UL (ref 4.1–5.7)
RBC MORPH BLD: ABNORMAL
SODIUM SERPL-SCNC: 134 MMOL/L (ref 136–145)
SPECIMEN HOLD: NORMAL
TROPONIN T SERPL HS-MCNC: 186.4 NG/L (ref 0–22)
TROPONIN T SERPL HS-MCNC: 190.2 NG/L (ref 0–22)
WBC # BLD AUTO: 11.8 K/UL (ref 4.1–11.1)

## 2025-07-09 PROCEDURE — 80053 COMPREHEN METABOLIC PANEL: CPT

## 2025-07-09 PROCEDURE — 71045 X-RAY EXAM CHEST 1 VIEW: CPT

## 2025-07-09 PROCEDURE — 96375 TX/PRO/DX INJ NEW DRUG ADDON: CPT

## 2025-07-09 PROCEDURE — 6360000004 HC RX CONTRAST MEDICATION: Performed by: STUDENT IN AN ORGANIZED HEALTH CARE EDUCATION/TRAINING PROGRAM

## 2025-07-09 PROCEDURE — 96366 THER/PROPH/DIAG IV INF ADDON: CPT

## 2025-07-09 PROCEDURE — 96361 HYDRATE IV INFUSION ADD-ON: CPT

## 2025-07-09 PROCEDURE — 83880 ASSAY OF NATRIURETIC PEPTIDE: CPT

## 2025-07-09 PROCEDURE — 71275 CT ANGIOGRAPHY CHEST: CPT

## 2025-07-09 PROCEDURE — 96365 THER/PROPH/DIAG IV INF INIT: CPT

## 2025-07-09 PROCEDURE — 84484 ASSAY OF TROPONIN QUANT: CPT

## 2025-07-09 PROCEDURE — 6360000002 HC RX W HCPCS: Performed by: EMERGENCY MEDICINE

## 2025-07-09 PROCEDURE — 2580000003 HC RX 258: Performed by: STUDENT IN AN ORGANIZED HEALTH CARE EDUCATION/TRAINING PROGRAM

## 2025-07-09 PROCEDURE — 93005 ELECTROCARDIOGRAM TRACING: CPT | Performed by: STUDENT IN AN ORGANIZED HEALTH CARE EDUCATION/TRAINING PROGRAM

## 2025-07-09 PROCEDURE — 36415 COLL VENOUS BLD VENIPUNCTURE: CPT

## 2025-07-09 PROCEDURE — 74177 CT ABD & PELVIS W/CONTRAST: CPT

## 2025-07-09 PROCEDURE — 2500000003 HC RX 250 WO HCPCS: Performed by: EMERGENCY MEDICINE

## 2025-07-09 PROCEDURE — 99285 EMERGENCY DEPT VISIT HI MDM: CPT

## 2025-07-09 PROCEDURE — 6370000000 HC RX 637 (ALT 250 FOR IP): Performed by: STUDENT IN AN ORGANIZED HEALTH CARE EDUCATION/TRAINING PROGRAM

## 2025-07-09 PROCEDURE — 2500000003 HC RX 250 WO HCPCS: Performed by: STUDENT IN AN ORGANIZED HEALTH CARE EDUCATION/TRAINING PROGRAM

## 2025-07-09 PROCEDURE — 85025 COMPLETE CBC W/AUTO DIFF WBC: CPT

## 2025-07-09 PROCEDURE — 83605 ASSAY OF LACTIC ACID: CPT

## 2025-07-09 PROCEDURE — 83735 ASSAY OF MAGNESIUM: CPT

## 2025-07-09 RX ORDER — 0.9 % SODIUM CHLORIDE 0.9 %
500 INTRAVENOUS SOLUTION INTRAVENOUS ONCE
Status: COMPLETED | OUTPATIENT
Start: 2025-07-09 | End: 2025-07-09

## 2025-07-09 RX ORDER — 0.9 % SODIUM CHLORIDE 0.9 %
1000 INTRAVENOUS SOLUTION INTRAVENOUS ONCE
Status: COMPLETED | OUTPATIENT
Start: 2025-07-09 | End: 2025-07-09

## 2025-07-09 RX ORDER — MIDODRINE HYDROCHLORIDE 5 MG/1
5 TABLET ORAL
COMMUNITY

## 2025-07-09 RX ORDER — MIDODRINE HYDROCHLORIDE 5 MG/1
5 TABLET ORAL ONCE
Status: COMPLETED | OUTPATIENT
Start: 2025-07-09 | End: 2025-07-09

## 2025-07-09 RX ORDER — IOPAMIDOL 755 MG/ML
100 INJECTION, SOLUTION INTRAVASCULAR
Status: COMPLETED | OUTPATIENT
Start: 2025-07-09 | End: 2025-07-09

## 2025-07-09 RX ADMIN — IOPAMIDOL 100 ML: 755 INJECTION, SOLUTION INTRAVENOUS at 15:19

## 2025-07-09 RX ADMIN — SODIUM CHLORIDE 500 ML: 900 INJECTION, SOLUTION INTRAVENOUS at 12:46

## 2025-07-09 RX ADMIN — WATER 2000 MG: 1 INJECTION INTRAMUSCULAR; INTRAVENOUS; SUBCUTANEOUS at 17:12

## 2025-07-09 RX ADMIN — NOREPINEPHRINE BITARTRATE 5 MCG/MIN: 1 INJECTION, SOLUTION, CONCENTRATE INTRAVENOUS at 13:48

## 2025-07-09 RX ADMIN — SODIUM CHLORIDE 1000 ML: 0.9 INJECTION, SOLUTION INTRAVENOUS at 12:11

## 2025-07-09 RX ADMIN — MIDODRINE HYDROCHLORIDE 5 MG: 5 TABLET ORAL at 13:52

## 2025-07-09 ASSESSMENT — LIFESTYLE VARIABLES
HOW OFTEN DO YOU HAVE A DRINK CONTAINING ALCOHOL: NEVER
HOW MANY STANDARD DRINKS CONTAINING ALCOHOL DO YOU HAVE ON A TYPICAL DAY: PATIENT DOES NOT DRINK

## 2025-07-09 ASSESSMENT — PAIN - FUNCTIONAL ASSESSMENT: PAIN_FUNCTIONAL_ASSESSMENT: NONE - DENIES PAIN

## 2025-07-09 NOTE — ED PROVIDER NOTES
Patient signed out to me pending transfer to [facility].  Presented hypotensive requiring pressors.  Please see initial notes for details.    CT chest abdomen pelvis shows likely pneumonia so patient was started on broad-spectrum antibiotics.  Other likely chronic findings noted as well.  Blood pressure improving after Levophed.  Initial plans were to send the patient to the ICU at [facility] But apparently there are no beds so patient accepted by Dr. Mckeon as an ED to ED transfer.  Patient stable for transport at this     Daron Serrano MD  07/09/25 [id number]

## 2025-07-09 NOTE — ED TRIAGE NOTES
Pt to ED via EMS w cc of weakness, low spo2 at home, and soft blood pressures for EMS. Pt was dialyzed yesterday and pt reports feeling worse since.   Vitals stable in triage, afebrile,  pt placed on 2L NC, which he is on at home.

## 2025-07-09 NOTE — PROGRESS NOTES
Spiritual Health History and Assessment/Progress Note  [facility]    Initial Encounter,  ,  ,      Name: Omid Garcia MRN: [id number]    Age: 89 y.o.     Sex: male   Language: English   Amish: [employer]ian   <principal problem not specified>     Date: 7/9/2025            Total Time Calculated: 25 min              Spiritual Assessment began in [place] EMERGENCY DEPARTMENT        Referral/Consult From: Rounding   Encounter Overview/Reason: Initial Encounter  Service Provided For: Patient    Rosi, Belief, Meaning:   Patient is connected with a rosi tradition or spiritual practice and has beliefs or practices that help with coping during difficult times  Family/Friends No family/friends present      Importance and Influence:  Patient has spiritual/personal beliefs that influence decisions regarding their health  Family/Friends No family/friends present    Community:  Patient feels well-supported. Support system includes: Spouse/Partner and Children  Family/Friends No family/friends present    Assessment and Plan of Care:     Patient Interventions include: Facilitated expression of thoughts and feelings, Explored spiritual coping/struggle/distress, and Affirmed coping skills/support systems  Family/Friends Interventions include: No family/friends present    Patient Plan of Care: Spiritual Care available upon further referral  Family/Friends Plan of Care: Spiritual Care available upon further referral     visit for initial assessment in [facility] ER. Pt sabiha through the support of his family--his wife and 2 daughters. Pt finds marcela in spending time with his family. Pt shared stories of his daughters and wife. Pt waiting for transfer to another hospital system. No needs expressed at this time. Shared  availability.    Electronically signed by MEAGAN Arshad on 7/9/2025 at 2:46 PM

## 2025-07-09 NOTE — ED NOTES
Attempted to call report to at [facility]. Jennifer, director notified this RN of potential bed change.

## 2025-07-09 NOTE — ED NOTES
Transfer center called, pt is going to [facility] CICU bed 3 nurse report number is [phone]. DMITRIY Hernandez notified.

## 2025-07-09 NOTE — ED PROVIDER NOTES
[place] EMERGENCY DEPARTMENT  EMERGENCY DEPARTMENT ENCOUNTER      Pt Name: Omid Garcia  MRN: [id number]  Birthdate 12/18/1935  Date of evaluation: 7/9/2025  Provider: Josh Betancourt MD    CHIEF COMPLAINT       Chief Complaint   Patient presents with    Fatigue    Hypotension       HISTORY OF PRESENT ILLNESS    HPI    89-year-old male history of end-stage renal disease on dialysis, A-fib on Eliquis here for generalized weakness and fatigue.  States that his routine dialysis yesterday, unsure how much fluid they took off.  Ever since then feeling very fatigued and tired.  EMS was called this morning, found his blood pressures to be 80s over 40s.  He is also reporting some chronic diarrhea.  Denies nausea vomiting chest pain.  States his breathing feels a little bit short.    Nursing notes reviewed.    REVIEW OF SYSTEMS     Review of Systems  Unless otherwise stated, a complete review of systems was asked of the patient. Pertinent positives are noted in the HPI section.    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

## 2025-07-09 NOTE — ED NOTES
Dr. Asia MD at bedside. Updated on total bolus of 1,500 ml finished. Dr. Asia MD verbal order to wait to start levophed.

## 2025-07-10 LAB
EKG ATRIAL RATE: 64 BPM
EKG DIAGNOSIS: NORMAL
EKG P AXIS: 22 DEGREES
EKG P-R INTERVAL: 200 MS
EKG Q-T INTERVAL: 450 MS
EKG QRS DURATION: 170 MS
EKG QTC CALCULATION (BAZETT): 464 MS
EKG R AXIS: -10 DEGREES
EKG T AXIS: 20 DEGREES
EKG VENTRICULAR RATE: 64 BPM

## 2025-07-10 PROCEDURE — 93010 ELECTROCARDIOGRAM REPORT: CPT | Performed by: SPECIALIST

## (undated) DEVICE — TUBING SUCTION UNIV 3/16 INX20 FT W/ SCALLOPED CONN STRL

## (undated) DEVICE — GAUZE,SPONGE,4"X4",16PLY,STRL,LF,10/TRAY: Brand: MEDLINE

## (undated) DEVICE — GARMENT COMPR REG WOM SZ 9 M SZ 7 FT NONSTERILE FLOTRN

## (undated) DEVICE — CATH URETH FOL 2W SH 20FRX5 --

## (undated) DEVICE — OPEN-END URETERAL CATHETER: Brand: COOK

## (undated) DEVICE — Device

## (undated) DEVICE — TURNOVER KIT OR CUST CMB FLD GEN LF

## (undated) DEVICE — SOL IRR STRL H2O 3000ML BG -- USE ITEM 173640

## (undated) DEVICE — CYSTO PACK: Brand: MEDLINE INDUSTRIES, INC.

## (undated) DEVICE — GLOVE SURG SZ 7.5 L11.73IN FNGR THK9.8MIL STRW LTX POLYMER

## (undated) DEVICE — BASIC SINGLE BASIN-LF: Brand: MEDLINE INDUSTRIES, INC.

## (undated) DEVICE — BAG DRNGE 4000ML CONT IRRIG ROUNDED TEARDROP SHP DISP

## (undated) DEVICE — DRAPE EQUIP C ARM 74X42 IN MOB XR W/ TIE RUBBER BND LF

## (undated) DEVICE — SOL IRR STRL H2O 500ML STRL --

## (undated) DEVICE — IRRIGATION KT CYSTO/TUR 10ML -- BX/5 720-100